# Patient Record
Sex: FEMALE | Race: OTHER | Employment: STUDENT
[De-identification: names, ages, dates, MRNs, and addresses within clinical notes are randomized per-mention and may not be internally consistent; named-entity substitution may affect disease eponyms.]

---

## 2017-01-04 ENCOUNTER — PROCEDURE VISIT (OUTPATIENT)
Dept: OBGYN | Facility: CLINIC | Age: 22
End: 2017-01-04

## 2017-01-04 VITALS
HEIGHT: 67 IN | DIASTOLIC BLOOD PRESSURE: 83 MMHG | SYSTOLIC BLOOD PRESSURE: 134 MMHG | BODY MASS INDEX: 22.49 KG/M2 | WEIGHT: 143.3 LBS | HEART RATE: 86 BPM

## 2017-01-04 DIAGNOSIS — Z23 NEED FOR HPV VACCINATION: ICD-10-CM

## 2017-01-04 DIAGNOSIS — R87.610 ATYPICAL SQUAMOUS CELLS OF UNDETERMINED SIGNIFICANCE ON CYTOLOGIC SMEAR OF CERVIX (ASC-US): Primary | ICD-10-CM

## 2017-01-04 PROCEDURE — 90651 9VHPV VACCINE 2/3 DOSE IM: CPT | Performed by: OBSTETRICS & GYNECOLOGY

## 2017-01-04 PROCEDURE — 90471 IMMUNIZATION ADMIN: CPT | Performed by: OBSTETRICS & GYNECOLOGY

## 2017-01-04 PROCEDURE — 57456 ENDOCERV CURETTAGE W/SCOPE: CPT | Performed by: OBSTETRICS & GYNECOLOGY

## 2017-03-08 ENCOUNTER — OFFICE VISIT (OUTPATIENT)
Dept: OBGYN | Facility: CLINIC | Age: 22
End: 2017-03-08

## 2017-03-08 DIAGNOSIS — Z23 NEED FOR HPV VACCINE: Primary | ICD-10-CM

## 2017-07-12 ENCOUNTER — OFFICE VISIT (OUTPATIENT)
Dept: OBGYN CLINIC | Age: 22
End: 2017-07-12
Payer: COMMERCIAL

## 2017-07-12 VITALS
BODY MASS INDEX: 22.18 KG/M2 | HEIGHT: 66 IN | DIASTOLIC BLOOD PRESSURE: 79 MMHG | HEART RATE: 82 BPM | WEIGHT: 138 LBS | SYSTOLIC BLOOD PRESSURE: 117 MMHG

## 2017-07-12 DIAGNOSIS — Z23 NEED FOR HPV VACCINATION: Primary | ICD-10-CM

## 2017-07-12 PROCEDURE — 90471 IMMUNIZATION ADMIN: CPT | Performed by: OBSTETRICS & GYNECOLOGY

## 2017-07-12 PROCEDURE — 90651 9VHPV VACCINE 2/3 DOSE IM: CPT | Performed by: OBSTETRICS & GYNECOLOGY

## 2017-07-25 ENCOUNTER — TELEPHONE (OUTPATIENT)
Dept: OBGYN CLINIC | Age: 22
End: 2017-07-25

## 2017-07-25 DIAGNOSIS — N30.00 ACUTE CYSTITIS WITHOUT HEMATURIA: Primary | ICD-10-CM

## 2017-07-25 RX ORDER — CIPROFLOXACIN 500 MG/1
250 TABLET, FILM COATED ORAL 2 TIMES DAILY
Qty: 6 TABLET | Refills: 0 | Status: SHIPPED | OUTPATIENT
Start: 2017-07-25 | End: 2017-07-28

## 2017-11-13 RX ORDER — NORGESTIMATE AND ETHINYL ESTRADIOL 7DAYSX3 28
1 KIT ORAL DAILY
Qty: 28 TABLET | Refills: 0 | Status: SHIPPED | OUTPATIENT
Start: 2017-11-13 | End: 2018-01-02 | Stop reason: SDUPTHER

## 2017-12-20 ENCOUNTER — TELEPHONE (OUTPATIENT)
Dept: OBGYN CLINIC | Age: 22
End: 2017-12-20

## 2017-12-20 ENCOUNTER — PATIENT MESSAGE (OUTPATIENT)
Dept: OBGYN CLINIC | Age: 22
End: 2017-12-20

## 2018-01-02 RX ORDER — NORGESTIMATE AND ETHINYL ESTRADIOL 7DAYSX3 28
1 KIT ORAL DAILY
Qty: 28 TABLET | Refills: 0 | Status: SHIPPED | OUTPATIENT
Start: 2018-01-02 | End: 2018-01-10 | Stop reason: SDUPTHER

## 2018-01-10 ENCOUNTER — HOSPITAL ENCOUNTER (OUTPATIENT)
Age: 23
Setting detail: SPECIMEN
Discharge: HOME | End: 2018-01-10
Payer: COMMERCIAL

## 2018-01-10 ENCOUNTER — OFFICE VISIT (OUTPATIENT)
Dept: OBGYN CLINIC | Age: 23
End: 2018-01-10
Payer: COMMERCIAL

## 2018-01-10 VITALS
BODY MASS INDEX: 20.88 KG/M2 | DIASTOLIC BLOOD PRESSURE: 80 MMHG | HEART RATE: 83 BPM | SYSTOLIC BLOOD PRESSURE: 118 MMHG | HEIGHT: 67 IN | WEIGHT: 133 LBS

## 2018-01-10 DIAGNOSIS — R22.32 AXILLARY LUMP, LEFT: ICD-10-CM

## 2018-01-10 DIAGNOSIS — Z01.419 WOMEN'S ANNUAL ROUTINE GYNECOLOGICAL EXAMINATION: Primary | ICD-10-CM

## 2018-01-10 PROCEDURE — 99395 PREV VISIT EST AGE 18-39: CPT | Performed by: OBSTETRICS & GYNECOLOGY

## 2018-01-10 RX ORDER — NORGESTIMATE AND ETHINYL ESTRADIOL 7DAYSX3 28
1 KIT ORAL DAILY
Qty: 28 TABLET | Refills: 12 | Status: SHIPPED | OUTPATIENT
Start: 2018-01-10 | End: 2019-01-11 | Stop reason: SDUPTHER

## 2018-01-10 ASSESSMENT — ENCOUNTER SYMPTOMS
ABDOMINAL PAIN: 0
SHORTNESS OF BREATH: 0
COUGH: 0

## 2018-01-10 NOTE — PROGRESS NOTES
[facility]ANS  [facility] - [address] [place] [id number]  Dept: [phone]    Chief complaint:   Chief Complaint   Patient presents with    Gynecologic Exam     prev pap 2017, negative       History Present Illness: Skip Cadena is a 24 yo female who presents for her annual exam.  She complains of a lump under her left axilla which she says started in October. She did have a migraine headache once in the last 5 years. She says she used to have migraines, and her PCP questioned if she should be on birth control. She denies any aura with them. She denies any bowel or bladder complaints. She does get frequent UTIs and has seen urology in the past, but since she has started probiotics they seem to be occurring less frequently. Current Medications (OTC/Herbal):   Current Outpatient Prescriptions   Medication Sig Dispense Refill    Norgestim-Eth Estrad Triphasic (TRI-SPRINTEC) 0.18/0.215/0.25 MG-35 MCG TABS Take 1 tablet by mouth daily 28 tablet 0    FLUARIX QUADRIVALENT 0.5 ML injection Inject 0.5 mLs into the muscle once  0    albuterol sulfate  (90 BASE) MCG/ACT inhaler Inhale 2 puffs into the lungs as needed      amphetamine-dextroamphetamine (ADDERALL) 20 MG tablet Take 20 mg by mouth 2 times daily       No current facility-administered medications for this visit.       Allergies: No Known Allergies  Past Medical History:   Past Medical History:   Diagnosis Date    ADHD (attention deficit hyperactivity disorder)      Past Surgical History:   Past Surgical History:   Procedure Laterality Date    WISDOM TOOTH EXTRACTION  2015     Obstetric History:   0  Para 0  Gynecologic History: LMP 17   Duration 2-4 d    Interval q  28 d  Tampons/Pads in a day: 3  Last Pap: 17       Any history of abnormal paps yes, ASCUS in 2016    PriorColpo/Biopsy colp without biopsies     Last Mammogram n/a   Result  Contraception: OCPs  Complications: none  STDs: HPV  Psychosocial History: Occupation:   Dental hygenist   Caffeine Yes    At risk for depression No    Abuse:   No  Seatbelt:   Yes  Exercise:  2-3x/wk    Social History     Social History    Marital status: Single     Spouse name: N/A    Number of children: N/A    Years of education: N/A     Occupational History    Not on file. Social History Main Topics    Smoking status: Never Smoker    Smokeless tobacco: Never Used    Alcohol use Yes      Comment: social    Drug use: No    Sexual activity: Yes     Partners: Male     Other Topics Concern    Not on file     Social History Narrative    No narrative on file       Family History   Problem Relation Age of Onset    Diabetes Mother      pre diabetic    Hypertension Maternal Grandmother     Heart Failure Maternal Grandmother     Stroke Maternal Grandmother     Anemia Maternal Grandmother        Review of Systems:   Review of Systems   Constitutional: Negative for chills and fever. HENT: Negative for hearing loss. Respiratory: Negative for cough and shortness of breath. Cardiovascular: Negative for chest pain and palpitations. Gastrointestinal: Negative for abdominal pain. Genitourinary: Negative for frequency. Denies vaginal discharge. Neurological: Negative for dizziness and headaches. Psychiatric/Behavioral: Negative for depression. Physical exam:  vitals:  Height   5  ft    7 in,  Weight    133 lbs,   118/80 BP  Gen: alert, no apparent distress  HEENT: No pathologic skin lesions noted,NC/AT,PERRL, normal midline nontender thyroid   Lung Exam: Clear to auscultation in all fields bilaterally, without wheezes,rales or rhonchi. Cardiac Exam: Normal sinus rhythm and rate, without murmurs, rubs or gallops appreciated. Breast Exam: Symmetric without pathological skin changes, nontender without discrete suspicious masses palpated, supraclavicular or axillary adenopathy or nipple discharge noted.   Abdominal Exam:

## 2018-01-19 LAB — CYTOLOGY REPORT: NORMAL

## 2018-09-12 ENCOUNTER — HOSPITAL ENCOUNTER (OUTPATIENT)
Dept: MAMMOGRAPHY | Age: 23
End: 2018-09-12
Payer: COMMERCIAL

## 2018-09-12 ENCOUNTER — HOSPITAL ENCOUNTER (OUTPATIENT)
Dept: ULTRASOUND IMAGING | Age: 23
Discharge: HOME | End: 2018-09-14
Payer: COMMERCIAL

## 2018-09-12 DIAGNOSIS — R22.32 AXILLARY LUMP, LEFT: ICD-10-CM

## 2018-09-12 PROCEDURE — 76642 ULTRASOUND BREAST LIMITED: CPT

## 2019-01-11 ENCOUNTER — OFFICE VISIT (OUTPATIENT)
Dept: OBGYN CLINIC | Age: 24
End: 2019-01-11
Payer: COMMERCIAL

## 2019-01-11 ENCOUNTER — HOSPITAL ENCOUNTER (OUTPATIENT)
Age: 24
Setting detail: SPECIMEN
Discharge: HOME | End: 2019-01-11
Payer: COMMERCIAL

## 2019-01-11 VITALS
WEIGHT: 128 LBS | SYSTOLIC BLOOD PRESSURE: 119 MMHG | DIASTOLIC BLOOD PRESSURE: 77 MMHG | HEIGHT: 67 IN | BODY MASS INDEX: 20.09 KG/M2 | HEART RATE: 101 BPM

## 2019-01-11 DIAGNOSIS — Z01.419 WOMEN'S ANNUAL ROUTINE GYNECOLOGICAL EXAMINATION: Primary | ICD-10-CM

## 2019-01-11 PROCEDURE — 99395 PREV VISIT EST AGE 18-39: CPT | Performed by: OBSTETRICS & GYNECOLOGY

## 2019-01-11 RX ORDER — NORGESTIMATE AND ETHINYL ESTRADIOL 7DAYSX3 28
1 KIT ORAL DAILY
Qty: 28 TABLET | Refills: 12 | Status: SHIPPED | OUTPATIENT
Start: 2019-01-11 | End: 2020-01-14 | Stop reason: SDUPTHER

## 2019-01-11 ASSESSMENT — ENCOUNTER SYMPTOMS
ABDOMINAL PAIN: 0
SHORTNESS OF BREATH: 0
BACK PAIN: 0

## 2019-01-26 LAB — CYTOLOGY REPORT: NORMAL

## 2020-01-14 ENCOUNTER — OFFICE VISIT (OUTPATIENT)
Dept: OBGYN CLINIC | Age: 25
End: 2020-01-14
Payer: COMMERCIAL

## 2020-01-14 VITALS
HEART RATE: 80 BPM | SYSTOLIC BLOOD PRESSURE: 116 MMHG | WEIGHT: 130 LBS | HEIGHT: 67 IN | DIASTOLIC BLOOD PRESSURE: 70 MMHG | BODY MASS INDEX: 20.4 KG/M2

## 2020-01-14 PROCEDURE — 99395 PREV VISIT EST AGE 18-39: CPT | Performed by: OBSTETRICS & GYNECOLOGY

## 2020-01-14 RX ORDER — NORGESTIMATE AND ETHINYL ESTRADIOL 7DAYSX3 28
1 KIT ORAL DAILY
Qty: 28 TABLET | Refills: 12 | Status: SHIPPED | OUTPATIENT
Start: 2020-01-14 | End: 2021-03-15 | Stop reason: SDUPTHER

## 2020-01-14 ASSESSMENT — ENCOUNTER SYMPTOMS
COUGH: 0
ABDOMINAL PAIN: 0
SHORTNESS OF BREATH: 0
BACK PAIN: 0

## 2020-01-14 NOTE — PROGRESS NOTES
History     Socioeconomic History    Marital status: Single     Spouse name: Not on file    Number of children: Not on file    Years of education: Not on file    Highest education level: Not on file   Occupational History    Not on file   Social Needs    Financial resource strain: Not on file    Food insecurity:     Worry: Not on file     Inability: Not on file    Transportation needs:     Medical: Not on file     Non-medical: Not on file   Tobacco Use    Smoking status: Never Smoker    Smokeless tobacco: Never Used   Substance and Sexual Activity    Alcohol use: Yes     Comment: social    Drug use: No    Sexual activity: Yes     Partners: Male   Lifestyle    Physical activity:     Days per week: Not on file     Minutes per session: Not on file    Stress: Not on file   Relationships    Social connections:     Talks on phone: Not on file     Gets together: Not on file     Attends [employer] service: Not on file     Active member of club or organization: Not on file     Attends meetings of clubs or organizations: Not on file     Relationship status: Not on file    Intimate partner violence:     Fear of current or ex partner: Not on file     Emotionally abused: Not on file     Physically abused: Not on file     Forced sexual activity: Not on file   Other Topics Concern    Not on file   Social History Narrative    Not on file       Family History   Problem Relation Age of Onset    Diabetes Mother         pre diabetic    Hypertension Maternal Grandmother     Heart Failure Maternal Grandmother     Stroke Maternal Grandmother     Anemia Maternal Grandmother        Review of Systems:   Review of Systems   Constitutional: Negative for chills and fever. HENT: Negative for congestion. Respiratory: Negative for cough and shortness of breath. Cardiovascular: Negative for chest pain and palpitations. Gastrointestinal: Negative for abdominal pain.    Genitourinary: Negative for dyspareunia and vaginal discharge. Musculoskeletal: Negative for back pain. Neurological: Negative for dizziness and light-headedness. Psychiatric/Behavioral: The patient is not nervous/anxious. Physical exam:  vitals:  Height   5  ft    7 in,  Weight    130 lbs,   116/70 BP  Gen: alert, no apparent distress  HEENT:No pathologic skin lesions noted,NC/AT,PERRL, normal midline nontender thyroid   Lung Exam: Clear to auscultation in all fields bilaterally, without wheezes,rales or rhonchi. Cardiac Exam: Normal sinus rhythm andrate, without murmurs, rubs or gallops appreciated. Breast Exam: Symmetric without pathological skin changes, nontender without discrete suspicious masses palpated, supraclavicular or axillary adenopathy or nipple discharge noted. Abdominal Exam: Nontender to deep palpation without organomegaly, masses or CVAT appreciated, BS positive. No spinal deformation or tenderness. External Genitalia: Normal development without vulvar,vaginal or cervical lesions noted. Normal vaginal discharge, uterus anterior, 4-6 weeks without CMT. Adnexa nontender without abnormal masses bilaterally. Rectal Exam: Omitted. Extremities: Nontender without clubbing, cyanosis or edema. F.R.O.M. Neurologic Exam: Grossly intact without noted sensorimotor deficits and oriented x 3. Assessment/Plan:   Unremarkable annual Gyn exam.    Cervical Cytology Evaluation begins at 24years old. If Negative Cytology, Follow-up screening per current guidelines. Mammograms every 1year. If 35 yo and last mammogram was negative. Birth control and barrier recommendations discussed. STD counseling and prevention reviewed. Routine health maintenance per patients PCP.   Pt to follow up for annual exam in 1 year     Humberto Willoughby MD  [address] [place] [address]

## 2020-01-27 ENCOUNTER — TELEPHONE (OUTPATIENT)
Dept: OBGYN CLINIC | Age: 25
End: 2020-01-27

## 2020-01-27 RX ORDER — NORGESTIMATE AND ETHINYL ESTRADIOL 7DAYSX3 28
1 KIT ORAL DAILY
Qty: 28 TABLET | Refills: 12 | Status: SHIPPED | OUTPATIENT
Start: 2020-01-27 | End: 2021-01-24 | Stop reason: SDUPTHER

## 2021-03-15 ENCOUNTER — OFFICE VISIT (OUTPATIENT)
Dept: OBGYN CLINIC | Age: 26
End: 2021-03-15
Payer: COMMERCIAL

## 2021-03-15 VITALS
SYSTOLIC BLOOD PRESSURE: 124 MMHG | DIASTOLIC BLOOD PRESSURE: 84 MMHG | HEIGHT: 66 IN | HEART RATE: 92 BPM | WEIGHT: 125 LBS | BODY MASS INDEX: 20.09 KG/M2

## 2021-03-15 DIAGNOSIS — Z01.419 ENCOUNTER FOR WELL WOMAN EXAM WITH ROUTINE GYNECOLOGICAL EXAM: Primary | ICD-10-CM

## 2021-03-15 PROCEDURE — 99395 PREV VISIT EST AGE 18-39: CPT | Performed by: OBSTETRICS & GYNECOLOGY

## 2021-03-15 RX ORDER — NORGESTIMATE AND ETHINYL ESTRADIOL 7DAYSX3 28
1 KIT ORAL DAILY
Qty: 28 TABLET | Refills: 12 | Status: SHIPPED | OUTPATIENT
Start: 2021-03-15 | End: 2022-03-18 | Stop reason: SDUPTHER

## 2021-03-15 ASSESSMENT — ENCOUNTER SYMPTOMS
COUGH: 0
BACK PAIN: 0
ABDOMINAL PAIN: 0
SHORTNESS OF BREATH: 0

## 2021-03-15 NOTE — PROGRESS NOTES
[facility]  [facility] - [address] [address] [facility]  Dept: [phone]    Chief complaint:   Chief Complaint   Patient presents with    Gynecologic Exam     Last pap 19 WNL       History Present Illness: Monica Harmon is a 29 yo female who presents for her annual exam.  She is doing well without any complaints. She was  last year and they are doing well. She is thinking they might want to try to conceive in the next few years, but isn't ready to conceive yet. She is feeling a lot of pressure and just isn't ready. She is sexually active with her  and denies any dyspareunia or vaginal discharge. She denies any bowel or bladder issues. Current Medications (OTC/Herbal):   Current Outpatient Medications   Medication Sig Dispense Refill    Norgestim-Eth Estrad Triphasic (TRI-SPRINTEC) 0.18/0.215/0.25 MG-35 MCG TABS Take 1 tablet by mouth daily 28 tablet 12    Lisdexamfetamine Dimesylate (VYVANSE) 40 MG CAPS Take 1 tablet by mouth daily. Heloisnessa Binet FLUARIX QUADRIVALENT 0.5 ML injection Inject 0.5 mLs into the muscle once  0    albuterol sulfate  (90 BASE) MCG/ACT inhaler Inhale 2 puffs into the lungs as needed      Norgestim-Eth Estrad Triphasic 0.18/0.215/0.25 MG-35 MCG TABS Take 1 tablet by mouth daily 28 tablet 2     No current facility-administered medications for this visit.       Allergies: No Known Allergies  Past Medical History:   Past Medical History:   Diagnosis Date    ADHD (attention deficit hyperactivity disorder)      Past Surgical History:   Past Surgical History:   Procedure Laterality Date    WISDOM TOOTH EXTRACTION  2015     Obstetric History:   0  Para 0  Gynecologic History: LMP 19   Menarche 10  Duration 2-3 d    Interval q  28 d  Tampons/Pads in a day: 2-4  Last Pap: 19       Any history of abnormal paps ASCUS     PriorColpo/Biopsy colp without bx     Last Mammogram n/a  Contraception: OCPs  Complications: none  STDs: HPV  Psychosocial History: Occupation:   Dental hygienist school  Caffeine Yes    At risk for depression No    Abuse:   no  Seatbelt:   Yes  Exercise:  2-3x/wk    Social History     Socioeconomic History    Marital status: Single     Spouse name: Not on file    Number of children: Not on file    Years of education: Not on file    Highest education level: Not on file   Occupational History    Not on file   Social Needs    Financial resource strain: Not on file    Food insecurity     Worry: Not on file     Inability: Not on file   [employer] needs     Medical: Not on file     Non-medical: Not on file   Tobacco Use    Smoking status: Never Smoker    Smokeless tobacco: Never Used   Substance and Sexual Activity    Alcohol use: Yes     Comment: social    Drug use: No    Sexual activity: Yes     Partners: Male   Lifestyle    Physical activity     Days per week: Not on file     Minutes per session: Not on file    Stress: Not on file   Relationships    Social connections     Talks on phone: Not on file     Gets together: Not on file     Attends [employer] service: Not on file     Active member of club or organization: Not on file     Attends meetings of clubs or organizations: Not on file     Relationship status: Not on file    Intimate partner violence     Fear of current or ex partner: Not on file     Emotionally abused: Not on file     Physically abused: Not on file     Forced sexual activity: Not on file   Other Topics Concern    Not on file   Social History Narrative    Not on file       Family History   Problem Relation Age of Onset    Diabetes Mother         pre diabetic    Hypertension Maternal Grandmother     Heart Failure Maternal Grandmother     Stroke Maternal Grandmother     Anemia Maternal Grandmother        Review of Systems:   Review of Systems   Constitutional: Negative for chills and fever. HENT: Negative for congestion.     Respiratory: Negative for cough and shortness of breath. Cardiovascular: Negative for chest pain and palpitations. Gastrointestinal: Negative for abdominal pain. Genitourinary: Negative for dyspareunia, pelvic pain and vaginal discharge. Musculoskeletal: Negative for back pain. Neurological: Negative for dizziness and light-headedness. Psychiatric/Behavioral: The patient is not nervous/anxious. Physical exam:  vitals:  Height   5  ft    6 in,  Weight    125 lbs,   124/84 BP  Gen: alert, no apparent distress  HEENT:No pathologic skin lesions noted,NC/AT,PERRL, normal midline nontender thyroid   Lung Exam: Clear to auscultation in all fields bilaterally, without wheezes,rales or rhonchi. Cardiac Exam: Normal sinus rhythm andrate, without murmurs, rubs or gallops appreciated. Breast Exam: Symmetric without pathological skin changes, nontender without discrete suspicious masses palpated, supraclavicular or axillary adenopathy or nipple discharge noted. Abdominal Exam: Nontender to deep palpation without organomegaly, masses or CVAT appreciated, BS positive. No spinal deformation or tenderness. External Genitalia: Normal development without vulvar,vaginal or cervical lesions noted. Normal vaginal discharge, uterus anterior, 4-6 weeks without CMT. Adnexa nontender without abnormal masses bilaterally. Rectal Exam: Omitted. Extremities: Nontender without clubbing, cyanosis or edema. F.R.O.M. Neurologic Exam: Grossly intact without noted sensorimotor deficits and oriented x 3. Assessment/Plan:   Unremarkable annual Gyn exam.    Cervical Cytology Evaluation begins at 24years old. If Negative Cytology, Follow-up screening per current guidelines. Mammograms every 1year. If 37 yo and last mammogram was negative. Birth control and barrier recommendations discussed. STD counseling and prevention reviewed. Routine health maintenance per patients PCP.   Pt to follow up for annual exam in 1 year    Lakeisha Sanchez [facility]

## 2022-03-18 ENCOUNTER — OFFICE VISIT (OUTPATIENT)
Dept: OBGYN CLINIC | Age: 27
End: 2022-03-18
Payer: COMMERCIAL

## 2022-03-18 VITALS
HEIGHT: 65 IN | BODY MASS INDEX: 24.16 KG/M2 | DIASTOLIC BLOOD PRESSURE: 78 MMHG | SYSTOLIC BLOOD PRESSURE: 114 MMHG | WEIGHT: 145 LBS | HEART RATE: 83 BPM

## 2022-03-18 DIAGNOSIS — N94.6 DYSMENORRHEA: ICD-10-CM

## 2022-03-18 DIAGNOSIS — Z01.419 WOMEN'S ANNUAL ROUTINE GYNECOLOGICAL EXAMINATION: Primary | ICD-10-CM

## 2022-03-18 PROCEDURE — 99395 PREV VISIT EST AGE 18-39: CPT | Performed by: OBSTETRICS & GYNECOLOGY

## 2022-03-18 RX ORDER — NORGESTIMATE AND ETHINYL ESTRADIOL 7DAYSX3 28
1 KIT ORAL DAILY
Qty: 28 TABLET | Refills: 12 | Status: SHIPPED | OUTPATIENT
Start: 2022-03-18

## 2022-03-18 ASSESSMENT — ENCOUNTER SYMPTOMS
ABDOMINAL PAIN: 0
COUGH: 0
SHORTNESS OF BREATH: 0
BACK PAIN: 0

## 2022-03-18 NOTE — PROGRESS NOTES
[address] [facility] - [address] [facility] [facility] [place] [address]  Dept: [phone]    Chief complaint:   Chief Complaint   Patient presents with    Annual Exam     last 2019 N        History Present Illness: Jorge Logan is a 28 yo female who presents for her annual exam.  She is doing well without any complaints. She has been on OCPs for a long time and wanted to make sure it was ok to continue on it. She is very happy with her job and how life is going. She is going back to school for her bachelor's in interdisciplinary studies and biology. 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 She is sexually active with her  and denies any dyspareunia. She denies any bowel or bladder issues. Current Medications (OTC/Herbal):   Current Outpatient Medications   Medication Sig Dispense Refill    Norgestim-Eth Estrad Triphasic (TRI-SPRINTEC) 0.18/0.215/0.25 MG-35 MCG TABS Take 1 tablet by mouth daily 28 tablet 12    Lisdexamfetamine Dimesylate (VYVANSE) 40 MG CAPS Take 1 tablet by mouth daily. Shaye Ferris Norgestim-Eth Estrad Triphasic 0.18/0.215/0.25 MG-35 MCG TABS Take 1 tablet by mouth daily 28 tablet 2    FLUARIX QUADRIVALENT 0.5 ML injection Inject 0.5 mLs into the muscle once  0    albuterol sulfate  (90 BASE) MCG/ACT inhaler Inhale 2 puffs into the lungs as needed       No current facility-administered medications for this visit.      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    At risk for depression No    Abuse:   no  Seatbelt:   Yes  Exercise:  2-3x/wk    Social History     Socioeconomic History    Marital status: Single     Spouse name: Not on file    Number of children: Not on file    Years of education: Not on file    Highest education level: Not on file   Occupational History    Not on file   Tobacco Use    Smoking status: Never Smoker    Smokeless tobacco: Never Used   Vaping Use    Vaping Use: Never used   Substance and Sexual Activity    Alcohol use: Yes     Comment: social    Drug use: No    Sexual activity: Yes     Partners: Male   Other Topics Concern    Not on file   Social History Narrative    Not on file     Social Determinants of Health     Financial Resource Strain:     Difficulty of Paying Living Expenses: Not on file   Food Insecurity:     Worried About Running Out of Food in the Last Year: Not on file    Kaleigh of Food in the Last Year: Not on file   Transportation Needs:     Lack of Transportation (Medical): Not on file    Lack of Transportation (Non-Medical):  Not on file   Physical Activity:     Days of Exercise per Week: Not on file    Minutes of Exercise per Session: Not on file   Stress:     Feeling of Stress : Not on file   Social Connections:     Frequency of Communication with Friends and Family: Not on file    Frequency of Social Gatherings with Friends and Family: Not on file    Attends Temple Services: Not on file    Active Member of Clubs or Organizations: Not on file    Attends Club or Organization Meetings: Not on file    Marital Status: Not on file   Intimate Partner Violence:     Fear of Current or Ex-Partner: Not on file    Emotionally Abused: Not on file    Physically Abused: Not on file    Sexually Abused: Not on file   Housing Stability:     Unable to Pay for Housing in the Last Year: Not on file    Number of Places Lived in the Last Year: Not on file    Unstable Housing in the Last Year: Not on file       Family History   Problem Relation Age of Onset    Diabetes Mother         pre diabetic    Hypertension Maternal Grandmother     Heart Failure Maternal Grandmother     Stroke Maternal Grandmother     Anemia Maternal Grandmother        Review of Systems:   Review of Systems   Constitutional: Negative for chills and fever. HENT: Negative for congestion. Respiratory: Negative for cough and shortness of breath. Cardiovascular: Negative for chest pain and palpitations. Gastrointestinal: Negative for abdominal pain. Genitourinary: Negative for dyspareunia, pelvic pain and vaginal discharge. Musculoskeletal: Negative for back pain. Neurological: Negative for dizziness and light-headedness. Psychiatric/Behavioral: The patient is not nervous/anxious. Physical exam:  vitals:  Height   5  ft    6 in,  Weight    145 lbs,   114/78 BP  Gen: alert, no apparent distress  HEENT:No pathologic skin lesions noted,NC/AT,PERRL, normal midline nontender thyroid   Lung Exam: Clear to auscultation in all fields bilaterally, without wheezes,rales or rhonchi. Cardiac Exam: Normal sinus rhythm andrate, without murmurs, rubs or gallops appreciated. Breast Exam: Symmetric without pathological skin changes, nontender without discrete suspicious masses palpated, supraclavicular or axillary adenopathy or nipple discharge noted. Abdominal Exam: Nontender to deep palpation without organomegaly, masses or CVAT appreciated, BS positive. No spinal deformation or tenderness. External Genitalia: Normal development without vulvar,vaginal or cervical lesions noted. Normal vaginal discharge, uterus anterior, 4-6 weeks without CMT. Adnexa nontender without abnormal masses bilaterally. Rectal Exam: Omitted. Extremities: Nontender without clubbing, cyanosis or edema. F. R.O.M. Neurologic Exam: Grossly intact without noted sensorimotor deficits and oriented x 3. Assessment/Plan:   Unremarkable annual Gyn exam.    Cervical Cytology Evaluation begins at 24years old. If Negative Cytology, Follow-up screening per current guidelines. Mammograms every 1year. If 37 yo and last mammogram was negative. Hereditary Breast, Ovarian, Colon and Uterine Cancer screening done. Birth control and barrier recommendations discussed. STD counseling and prevention reviewed. Gardisil counseling completed for all patients 7-33 yo. Routine health maintenance per patients PCP.   Pt to follow up for annual exam in 1 year    Razia Dinh MD  [address]

## 2022-03-24 SDOH — HEALTH STABILITY: PHYSICAL HEALTH: ON AVERAGE, HOW MANY MINUTES DO YOU ENGAGE IN EXERCISE AT THIS LEVEL?: 60 MIN

## 2022-03-24 SDOH — HEALTH STABILITY: PHYSICAL HEALTH: ON AVERAGE, HOW MANY DAYS PER WEEK DO YOU ENGAGE IN MODERATE TO STRENUOUS EXERCISE (LIKE A BRISK WALK)?: 3 DAYS

## 2022-03-25 ENCOUNTER — OFFICE VISIT (OUTPATIENT)
Dept: FAMILY MEDICINE CLINIC | Age: 27
End: 2022-03-25
Payer: COMMERCIAL

## 2022-03-25 VITALS
OXYGEN SATURATION: 99 % | HEART RATE: 98 BPM | BODY MASS INDEX: 24.13 KG/M2 | SYSTOLIC BLOOD PRESSURE: 110 MMHG | DIASTOLIC BLOOD PRESSURE: 70 MMHG | WEIGHT: 145 LBS

## 2022-03-25 DIAGNOSIS — Z79.899 MEDICATION MANAGEMENT: ICD-10-CM

## 2022-03-25 DIAGNOSIS — M54.2 NECK PAIN: ICD-10-CM

## 2022-03-25 DIAGNOSIS — F90.2 ATTENTION DEFICIT HYPERACTIVITY DISORDER (ADHD), COMBINED TYPE: ICD-10-CM

## 2022-03-25 DIAGNOSIS — Z13.29 SCREENING FOR THYROID DISORDER: ICD-10-CM

## 2022-03-25 DIAGNOSIS — Z76.89 ENCOUNTER TO ESTABLISH CARE: Primary | ICD-10-CM

## 2022-03-25 DIAGNOSIS — Z13.220 SCREENING, LIPID: ICD-10-CM

## 2022-03-25 PROCEDURE — 99204 OFFICE O/P NEW MOD 45 MIN: CPT | Performed by: NURSE PRACTITIONER

## 2022-03-25 RX ORDER — MONTELUKAST SODIUM 10 MG/1
10 TABLET ORAL NIGHTLY
Qty: 90 TABLET | Refills: 1 | Status: CANCELLED | OUTPATIENT
Start: 2022-03-25

## 2022-03-25 RX ORDER — LISDEXAMFETAMINE DIMESYLATE 40 MG/1
40 CAPSULE ORAL DAILY
Qty: 30 CAPSULE | Refills: 0 | Status: SHIPPED | OUTPATIENT
Start: 2022-05-25 | End: 2022-06-24

## 2022-03-25 RX ORDER — LISDEXAMFETAMINE DIMESYLATE 40 MG/1
40 CAPSULE ORAL DAILY
Qty: 30 CAPSULE | Refills: 0 | Status: SHIPPED | OUTPATIENT
Start: 2022-04-25 | End: 2022-05-25

## 2022-03-25 RX ORDER — LISDEXAMFETAMINE DIMESYLATE 40 MG/1
1 CAPSULE ORAL DAILY
Qty: 30 CAPSULE | Refills: 0 | Status: SHIPPED | OUTPATIENT
Start: 2022-03-25 | End: 2022-04-24

## 2022-03-25 RX ORDER — PANTOPRAZOLE SODIUM 20 MG/1
20 TABLET, DELAYED RELEASE ORAL DAILY
Qty: 90 TABLET | Refills: 1 | Status: CANCELLED | OUTPATIENT
Start: 2022-03-25

## 2022-03-25 RX ORDER — PANTOPRAZOLE SODIUM 20 MG/1
20 TABLET, DELAYED RELEASE ORAL DAILY
COMMUNITY

## 2022-03-25 RX ORDER — CETIRIZINE HYDROCHLORIDE 10 MG/1
10 TABLET ORAL
COMMUNITY
Start: 2021-12-01

## 2022-03-25 RX ORDER — MONTELUKAST SODIUM 10 MG/1
10 TABLET ORAL NIGHTLY
COMMUNITY
Start: 2021-12-01

## 2022-03-25 SDOH — ECONOMIC STABILITY: FOOD INSECURITY: WITHIN THE PAST 12 MONTHS, THE FOOD YOU BOUGHT JUST DIDN'T LAST AND YOU DIDN'T HAVE MONEY TO GET MORE.: NEVER TRUE

## 2022-03-25 SDOH — ECONOMIC STABILITY: FOOD INSECURITY: WITHIN THE PAST 12 MONTHS, YOU WORRIED THAT YOUR FOOD WOULD RUN OUT BEFORE YOU GOT MONEY TO BUY MORE.: NEVER TRUE

## 2022-03-25 ASSESSMENT — SOCIAL DETERMINANTS OF HEALTH (SDOH): HOW HARD IS IT FOR YOU TO PAY FOR THE VERY BASICS LIKE FOOD, HOUSING, MEDICAL CARE, AND HEATING?: NOT HARD AT ALL

## 2022-03-25 ASSESSMENT — PATIENT HEALTH QUESTIONNAIRE - PHQ9
SUM OF ALL RESPONSES TO PHQ QUESTIONS 1-9: 0
1. LITTLE INTEREST OR PLEASURE IN DOING THINGS: 0
SUM OF ALL RESPONSES TO PHQ QUESTIONS 1-9: 0
SUM OF ALL RESPONSES TO PHQ9 QUESTIONS 1 & 2: 0
2. FEELING DOWN, DEPRESSED OR HOPELESS: 0
SUM OF ALL RESPONSES TO PHQ QUESTIONS 1-9: 0
SUM OF ALL RESPONSES TO PHQ QUESTIONS 1-9: 0

## 2022-03-25 ASSESSMENT — ENCOUNTER SYMPTOMS
SHORTNESS OF BREATH: 0
COUGH: 0

## 2022-03-25 NOTE — PROGRESS NOTES
Claudette Hook (:  1995) is a 27 y.o. female,Established patient, here for evaluation of the following chief complaint(s):  Establish Care         ASSESSMENT/PLAN:  1. Encounter to establish care  2. Attention deficit hyperactivity disorder (ADHD), combined type  -     Lisdexamfetamine Dimesylate (VYVANSE) 40 MG CAPS; Take 1 tablet by mouth daily for 30 days. , Disp-30 capsule, R-0Normal  -     Lisdexamfetamine Dimesylate (VYVANSE) 40 MG CAPS; Take 40 mg by mouth daily for 30 days. , Disp-30 capsule, R-0Normal  -     Lisdexamfetamine Dimesylate (VYVANSE) 40 MG CAPS; Take 40 mg by mouth daily for 30 days. , Disp-30 capsule, R-0Normal  3. Neck pain  -     External Referral To Physical Therapy  4. Screening for thyroid disorder  -     TSH With Reflex Ft4; Future  5. Screening, lipid  -     Lipid, Fasting; Future  6. Medication management  -     CBC; Future  -     Comprehensive Metabolic Panel; Future      No follow-ups on file. Subjective   SUBJECTIVE/OBJECTIVE:  HPI   Saw gi for acid reflux. Feels gerd is of good control right now. Hasn't been taking protonix. Does get seasonal allergies. On zyrtec, singulair, flonase and astelin. History of asthma, uses reyna only when sick. Has been out of singulair for a month. Is going to go off her meds, doesn't feel they help     adhd- adderall made her mad all the time. She was switched to vyvanse, has been on this dose for a few years. Her doc in [facility] increased her dose and this was too much. They decreased to 30 mg and she couldn't focus. Would like a referral to physical therapy. Was seeing one in [facility] for neck and upper back tension and it was helping. Review of Systems   Constitutional: Negative for chills and fever. Respiratory: Negative for cough and shortness of breath. Cardiovascular: Negative for chest pain, palpitations and leg swelling.        Objective      Vitals:    22 1417   BP: 110/70   Pulse: 98   SpO2: 99% Physical Exam  Constitutional:       Appearance: She is well-developed. HENT:      Right Ear: Tympanic membrane and external ear normal.      Left Ear: Tympanic membrane and external ear normal.      Nose: Nose normal.   Cardiovascular:      Rate and Rhythm: Normal rate and regular rhythm. Heart sounds: Normal heart sounds, S1 normal and S2 normal.   Pulmonary:      Effort: Pulmonary effort is normal. No respiratory distress. Breath sounds: Normal breath sounds. Musculoskeletal:         General: No deformity. Normal range of motion. Cervical back: Full passive range of motion without pain and normal range of motion. Skin:     General: Skin is warm and dry. Neurological:      Mental Status: She is alert and oriented to person, place, and time. An electronic signature was used to authenticate this note.     --Kali Forbes, APRN - CNP

## 2022-03-25 NOTE — PROGRESS NOTES
Patient is present to establish care  Patient is needing med refills    Patient states she was previously seeing PT  States she has neck and shoulder pain  States she was seeing someone [facility] but would like a referral for someone closer    Patient states was seeing GI for IBS but that doctor has left the practice [place]

## 2022-03-28 DIAGNOSIS — Z01.419 WOMEN'S ANNUAL ROUTINE GYNECOLOGICAL EXAMINATION: ICD-10-CM

## 2022-03-29 ENCOUNTER — HOSPITAL ENCOUNTER (OUTPATIENT)
Age: 27
Setting detail: SPECIMEN
Discharge: HOME | End: 2022-03-29

## 2022-03-29 DIAGNOSIS — Z79.899 MEDICATION MANAGEMENT: ICD-10-CM

## 2022-03-29 DIAGNOSIS — Z13.29 SCREENING FOR THYROID DISORDER: ICD-10-CM

## 2022-03-29 DIAGNOSIS — Z13.220 SCREENING, LIPID: ICD-10-CM

## 2022-03-29 LAB
ALBUMIN SERPL-MCNC: 4.2 G/DL (ref 3.5–5.2)
ALBUMIN/GLOBULIN RATIO: 1.6 (ref 1–2.5)
ALP BLD-CCNC: 48 U/L (ref 35–104)
ALT SERPL-CCNC: 24 U/L (ref 5–33)
ANION GAP SERPL CALCULATED.3IONS-SCNC: 15 MMOL/L (ref 9–17)
AST SERPL-CCNC: 40 U/L
BILIRUB SERPL-MCNC: 0.32 MG/DL (ref 0.3–1.2)
BUN BLDV-MCNC: 18 MG/DL (ref 6–20)
CALCIUM SERPL-MCNC: 9.5 MG/DL (ref 8.6–10.4)
CHLORIDE BLD-SCNC: 103 MMOL/L (ref 98–107)
CHOLESTEROL, FASTING: 180 MG/DL
CHOLESTEROL/HDL RATIO: 2
CO2: 22 MMOL/L (ref 20–31)
CREAT SERPL-MCNC: 0.79 MG/DL (ref 0.5–0.9)
GFR AFRICAN AMERICAN: >60 ML/MIN
GFR NON-AFRICAN AMERICAN: >60 ML/MIN
GFR SERPL CREATININE-BSD FRML MDRD: ABNORMAL ML/MIN/{1.73_M2}
GLUCOSE BLD-MCNC: 70 MG/DL (ref 70–99)
HCT VFR BLD CALC: 40.7 % (ref 36.3–47.1)
HDLC SERPL-MCNC: 89 MG/DL
HEMOGLOBIN: 13.3 G/DL (ref 11.9–15.1)
LDL CHOLESTEROL: 77 MG/DL (ref 0–130)
MCH RBC QN AUTO: 29.3 PG (ref 25.2–33.5)
MCHC RBC AUTO-ENTMCNC: 32.7 G/DL (ref 28.4–34.8)
MCV RBC AUTO: 89.6 FL (ref 82.6–102.9)
NRBC AUTOMATED: 0 PER 100 WBC
PDW BLD-RTO: 12.2 % (ref 11.8–14.4)
PLATELET # BLD: 244 K/UL (ref 138–453)
PMV BLD AUTO: 11.3 FL (ref 8.1–13.5)
POTASSIUM SERPL-SCNC: 4.4 MMOL/L (ref 3.7–5.3)
RBC # BLD: 4.54 M/UL (ref 3.95–5.11)
SODIUM BLD-SCNC: 140 MMOL/L (ref 135–144)
TOTAL PROTEIN: 6.9 G/DL (ref 6.4–8.3)
TRIGLYCERIDE, FASTING: 72 MG/DL
TSH SERPL DL<=0.05 MIU/L-ACNC: 4.81 UIU/ML (ref 0.3–5)
WBC # BLD: 5.7 K/UL (ref 3.5–11.3)

## 2022-03-31 DIAGNOSIS — R74.8 ELEVATED LIVER ENZYMES: Primary | ICD-10-CM

## 2022-05-11 DIAGNOSIS — F90.2 ATTENTION DEFICIT HYPERACTIVITY DISORDER (ADHD), COMBINED TYPE: ICD-10-CM

## 2022-05-11 RX ORDER — LISDEXAMFETAMINE DIMESYLATE 40 MG/1
1 CAPSULE ORAL DAILY
Qty: 30 CAPSULE | Refills: 0 | OUTPATIENT
Start: 2022-05-11 | End: 2022-06-10

## 2022-06-17 ENCOUNTER — OFFICE VISIT (OUTPATIENT)
Dept: FAMILY MEDICINE CLINIC | Age: 27
End: 2022-06-17
Payer: COMMERCIAL

## 2022-06-17 VITALS
WEIGHT: 144 LBS | BODY MASS INDEX: 23.96 KG/M2 | HEART RATE: 96 BPM | DIASTOLIC BLOOD PRESSURE: 70 MMHG | SYSTOLIC BLOOD PRESSURE: 110 MMHG | OXYGEN SATURATION: 97 %

## 2022-06-17 DIAGNOSIS — F90.9 ATTENTION DEFICIT HYPERACTIVITY DISORDER (ADHD), UNSPECIFIED ADHD TYPE: Primary | ICD-10-CM

## 2022-06-17 DIAGNOSIS — Z76.0 MEDICATION REFILL: ICD-10-CM

## 2022-06-17 PROCEDURE — 99213 OFFICE O/P EST LOW 20 MIN: CPT | Performed by: NURSE PRACTITIONER

## 2022-06-17 RX ORDER — ONDANSETRON 4 MG/1
4 TABLET, ORALLY DISINTEGRATING ORAL 3 TIMES DAILY PRN
Qty: 21 TABLET | Refills: 0 | Status: SHIPPED | OUTPATIENT
Start: 2022-06-17

## 2022-06-17 RX ORDER — SUMATRIPTAN 25 MG/1
25 TABLET, FILM COATED ORAL
COMMUNITY
End: 2022-06-17 | Stop reason: SDUPTHER

## 2022-06-17 RX ORDER — SUMATRIPTAN 25 MG/1
25 TABLET, FILM COATED ORAL
Qty: 9 TABLET | Refills: 2 | Status: SHIPPED | OUTPATIENT
Start: 2022-06-17 | End: 2022-09-16

## 2022-06-17 ASSESSMENT — ENCOUNTER SYMPTOMS
COUGH: 0
SHORTNESS OF BREATH: 0

## 2022-06-17 NOTE — PROGRESS NOTES
Fahad Mohan (:  1995) is a 32 y.o. female,Established patient, here for evaluation of the following chief complaint(s):  ADHD and 3 Month Follow-Up         ASSESSMENT/PLAN:  1. Attention deficit hyperactivity disorder (ADHD), unspecified ADHD type  -     Lisdexamfetamine Dimesylate 40 MG CAPS; Take 40 mg by mouth daily for 29 days. , Disp-30 capsule, R-0Normal  -     Lisdexamfetamine Dimesylate 40 MG CAPS; Take 40 mg by mouth daily for 29 days. , Disp-30 capsule, R-0Normal  -     Lisdexamfetamine Dimesylate 40 MG CAPS; Take 40 mg by mouth daily for 29 days. , Disp-30 capsule, R-0Normal  2. Medication refill  -     SUMAtriptan (IMITREX) 25 MG tablet; Take 1 tablet by mouth once as needed for Migraine, Disp-9 tablet, R-2Normal  -     ondansetron (ZOFRAN-ODT) 4 MG disintegrating tablet; Take 1 tablet by mouth 3 times daily as needed for Nausea or Vomiting, Disp-21 tablet, R-0Normal    adhd meds working well    No follow-ups on file. Subjective   SUBJECTIVE/OBJECTIVE:  HPI     Migraine abortive therapy. Not needing very often. Hasn't used since 2019 but likes to have on hand. Review of Systems   Constitutional: Negative for chills and fever. Respiratory: Negative for cough and shortness of breath. Cardiovascular: Negative for chest pain, palpitations and leg swelling. Objective    Vitals:    22 0744   BP: 110/70   Pulse: 96   SpO2: 97%     Wt Readings from Last 3 Encounters:   22 144 lb (65.3 kg)   22 145 lb (65.8 kg)   22 145 lb (65.8 kg)       Physical Exam  Constitutional:       Appearance: She is well-developed. HENT:      Right Ear: External ear normal.      Left Ear: External ear normal.      Nose: Nose normal.   Cardiovascular:      Rate and Rhythm: Normal rate and regular rhythm. Heart sounds: Normal heart sounds, S1 normal and S2 normal.   Pulmonary:      Effort: Pulmonary effort is normal. No respiratory distress.       Breath sounds: Normal breath sounds. Musculoskeletal:         General: No deformity. Normal range of motion. Cervical back: Full passive range of motion without pain and normal range of motion. Skin:     General: Skin is warm and dry. Neurological:      Mental Status: She is alert and oriented to person, place, and time. An electronic signature was used to authenticate this note.     --Deborah Farrell, ADRIANA - CNP

## 2022-06-17 NOTE — PROGRESS NOTES
Patient is present for 3 month f/u for ADHD    Patient states she was getting migraine medication from her previous pcp  States her Imitrex is  and would like this refilled

## 2022-07-08 ENCOUNTER — HOSPITAL ENCOUNTER (OUTPATIENT)
Age: 27
Setting detail: SPECIMEN
Discharge: HOME | End: 2022-07-08

## 2022-07-08 ENCOUNTER — TELEPHONE (OUTPATIENT)
Dept: FAMILY MEDICINE CLINIC | Age: 27
End: 2022-07-08

## 2022-07-08 DIAGNOSIS — R74.8 ELEVATED LIVER ENZYMES: ICD-10-CM

## 2022-07-08 DIAGNOSIS — Z02.0 SCHOOL PHYSICAL EXAM: Primary | ICD-10-CM

## 2022-07-08 LAB
ALBUMIN SERPL-MCNC: 4.2 G/DL (ref 3.5–5.2)
ALBUMIN/GLOBULIN RATIO: 1.6 (ref 1–2.5)
ALP BLD-CCNC: 47 U/L (ref 35–104)
ALT SERPL-CCNC: 14 U/L (ref 5–33)
AST SERPL-CCNC: 17 U/L
BILIRUB SERPL-MCNC: 0.23 MG/DL (ref 0.3–1.2)
BILIRUBIN DIRECT: <0.08 MG/DL
BILIRUBIN, INDIRECT: ABNORMAL MG/DL (ref 0–1)
HEPATITIS C ANTIBODY: NONREACTIVE
TOTAL PROTEIN: 6.8 G/DL (ref 6.4–8.3)

## 2022-07-08 NOTE — TELEPHONE ENCOUNTER
Patient dropped off form for school. Patient is needing titers for Varicella. Patient has had vaccines for Hep B, MMR, and Tdap.

## 2022-07-08 NOTE — TELEPHONE ENCOUNTER
Patient informed. Also informed patient she will need 2nd MMR, we only have record of 1. Patient states she is going to look in her records to see if she already received it and will let us know.

## 2022-09-13 ENCOUNTER — OFFICE VISIT (OUTPATIENT)
Dept: FAMILY MEDICINE CLINIC | Age: 27
End: 2022-09-13
Payer: COMMERCIAL

## 2022-09-13 VITALS
SYSTOLIC BLOOD PRESSURE: 133 MMHG | HEART RATE: 103 BPM | OXYGEN SATURATION: 100 % | DIASTOLIC BLOOD PRESSURE: 99 MMHG | HEIGHT: 65 IN | WEIGHT: 144 LBS | BODY MASS INDEX: 23.99 KG/M2

## 2022-09-13 DIAGNOSIS — U09.9 COVID-19 LONG HAULER MANIFESTING CHRONIC COUGH: Primary | ICD-10-CM

## 2022-09-13 DIAGNOSIS — R05.3 COVID-19 LONG HAULER MANIFESTING CHRONIC COUGH: Primary | ICD-10-CM

## 2022-09-13 DIAGNOSIS — M94.0 COSTOCHONDRITIS: ICD-10-CM

## 2022-09-13 PROCEDURE — 99213 OFFICE O/P EST LOW 20 MIN: CPT

## 2022-09-13 RX ORDER — BENZONATATE 200 MG/1
200 CAPSULE ORAL 3 TIMES DAILY PRN
Qty: 21 CAPSULE | Refills: 0 | Status: SHIPPED | OUTPATIENT
Start: 2022-09-13 | End: 2022-09-20

## 2022-09-13 ASSESSMENT — ENCOUNTER SYMPTOMS
COUGH: 1
NAUSEA: 0
SHORTNESS OF BREATH: 1
HEARTBURN: 0
RHINORRHEA: 0
SORE THROAT: 0
CHEST TIGHTNESS: 1
WHEEZING: 0
ABDOMINAL PAIN: 0
EYE PAIN: 0
HEMOPTYSIS: 0
VOMITING: 0
EYE ITCHING: 0

## 2022-09-13 NOTE — PROGRESS NOTES
[address] [address]  Dept: [phone]  Dept Fax: [phone]    Esther Wagner is a 32 y.o. female who presents to the urgent care today for her medical conditions/complaints as notedbelow. Esther Wagner is c/o of Cough (Pt has had cough and congestion in her chest )      HPI:     Patient states that she tested positive for COVID 11 days ago. Patient presents to the walk in for chest pressure from exercise and cough   Patient also went to [address] [place] [address] 17[facility]M urgent care on 9/10/2022 for headaches and sinus problem and was diagnosed with sinusitis and treated with Augmentin. Patient is not taking the Augmentin right now    PMH: Asthma, anxiety    Cough  This is a chronic problem. The current episode started 1 to 4 weeks ago (about 11 days and progessively got worse in the past two days). The problem has been rapidly worsening. The problem occurs constantly. The cough is Non-productive. Associated symptoms include shortness of breath. Pertinent negatives include no chest pain, chills, ear congestion, ear pain, fever, headaches, heartburn, hemoptysis, myalgias, nasal congestion, postnasal drip, rash, rhinorrhea, sore throat, sweats, weight loss or wheezing. Nothing aggravates the symptoms. She has tried a beta-agonist inhaler (delsym, flonase, albuterol with relief) for the symptoms. The treatment provided mild relief. Her past medical history is significant for asthma and environmental allergies. There is no history of bronchiectasis, bronchitis, COPD, emphysema or pneumonia.      Past Medical History:   Diagnosis Date    ADHD (attention deficit hyperactivity disorder)     Allergic rhinitis     Anxiety     Asthma     IBS (irritable bowel syndrome)         Current Outpatient Medications   Medication Sig Dispense Refill    benzonatate (TESSALON) 200 MG capsule Take 1 capsule by mouth 3 times daily as needed for Cough 21 capsule 0    SUMAtriptan (IMITREX) 25 MG tablet Take 1 tablet by mouth once as needed for Migraine 9 tablet 2    ondansetron (ZOFRAN-ODT) 4 MG disintegrating tablet Take 1 tablet by mouth 3 times daily as needed for Nausea or Vomiting 21 tablet 0    Lisdexamfetamine Dimesylate 40 MG CAPS Take 40 mg by mouth daily for 29 days. 30 capsule 0    Lisdexamfetamine Dimesylate 40 MG CAPS Take 40 mg by mouth daily for 29 days. 30 capsule 0    Lisdexamfetamine Dimesylate 40 MG CAPS Take 40 mg by mouth daily for 29 days. 30 capsule 0    pantoprazole (PROTONIX) 20 MG tablet Take 20 mg by mouth daily (Patient not taking: Reported on 6/17/2022)      montelukast (SINGULAIR) 10 MG tablet Take 10 mg by mouth nightly (Patient not taking: Reported on 6/17/2022)      cetirizine (ZYRTEC) 10 MG tablet Take 10 mg by mouth      Fluticasone Propionate (FLONASE NA) by Nasal route      Lisdexamfetamine Dimesylate (VYVANSE) 40 MG CAPS Take 1 tablet by mouth daily for 30 days. 30 capsule 0    Lisdexamfetamine Dimesylate (VYVANSE) 40 MG CAPS Take 40 mg by mouth daily for 30 days. 30 capsule 0    Lisdexamfetamine Dimesylate (VYVANSE) 40 MG CAPS Take 40 mg by mouth daily for 30 days. 30 capsule 0    Norgestim-Eth Estrad Triphasic (TRI-SPRINTEC) 0.18/0.215/0.25 MG-35 MCG TABS Take 1 tablet by mouth daily 28 tablet 12    albuterol sulfate  (90 BASE) MCG/ACT inhaler Inhale 2 puffs into the lungs as needed       No current facility-administered medications for this visit. Allergies   Allergen Reactions    Other     Seasonal        Subjective:      Review of Systems   Constitutional:  Negative for chills, fever and weight loss. HENT:  Negative for ear pain, postnasal drip, rhinorrhea and sore throat. Eyes:  Negative for pain and itching. Respiratory:  Positive for cough, chest tightness and shortness of breath. Negative for hemoptysis and wheezing. Cardiovascular:  Negative for chest pain and leg swelling. Gastrointestinal:  Negative for abdominal pain, heartburn, nausea and vomiting. Genitourinary:  Negative for difficulty urinating and dysuria. Musculoskeletal:  Negative for myalgias. Skin:  Negative for rash. Allergic/Immunologic: Positive for environmental allergies. Neurological:  Negative for dizziness and headaches. All other systems reviewed and are negative. 14 systems reviewed and negative except as listed in HPI. Objective:     Physical Exam  Vitals (patient sitting comfortably and speaking in complete sentences with no signs of respiratory distress) reviewed. Constitutional:       General: She is not in acute distress. Appearance: Normal appearance. She is not ill-appearing, toxic-appearing or diaphoretic. HENT:      Head: Normocephalic. Right Ear: Hearing, tympanic membrane and ear canal normal. No middle ear effusion. Tympanic membrane is not injected, erythematous or bulging. Left Ear: Hearing, tympanic membrane and ear canal normal.  No middle ear effusion. Tympanic membrane is not injected, erythematous or bulging. Nose: Nose normal. No congestion or rhinorrhea. Right Sinus: No maxillary sinus tenderness or frontal sinus tenderness. Left Sinus: No maxillary sinus tenderness or frontal sinus tenderness. Mouth/Throat:      Lips: Pink. Mouth: Mucous membranes are moist.      Pharynx: Oropharynx is clear. No pharyngeal swelling, oropharyngeal exudate or posterior oropharyngeal erythema. Eyes:      General:         Right eye: No discharge. Left eye: No discharge. Extraocular Movements: Extraocular movements intact. Conjunctiva/sclera: Conjunctivae normal.      Pupils: Pupils are equal, round, and reactive to light. Cardiovascular:      Rate and Rhythm: Regular rhythm. Tachycardia present. Heart sounds: Normal heart sounds. No murmur heard. Pulmonary:      Effort: Pulmonary effort is normal. No respiratory distress. Breath sounds: Normal breath sounds. No stridor. No wheezing, rhonchi or rales. Musculoskeletal:         General: No swelling, tenderness, deformity or signs of injury. Normal range of motion. Cervical back: Normal range of motion and neck supple. No rigidity or tenderness. Right lower leg: No edema. Left lower leg: No edema. Lymphadenopathy:      Cervical: No cervical adenopathy. Skin:     General: Skin is warm and dry. Capillary Refill: Capillary refill takes less than 2 seconds. Findings: No erythema or rash. Neurological:      Mental Status: She is alert and oriented to person, place, and time. Motor: No weakness. Coordination: Coordination normal.      Gait: Gait normal.   Psychiatric:         Mood and Affect: Mood normal.         Behavior: Behavior normal.         Thought Content: Thought content normal.         Judgment: Judgment normal.     BP (!) 133/99 (Site: Left Upper Arm, Position: Sitting, Cuff Size: Medium Adult)   Pulse (!) 103   Ht 5' 5\" (1.651 m)   Wt 144 lb (65.3 kg)   SpO2 100%   BMI 23.96 kg/m²     Assessment:       Diagnosis Orders   1. COVID-19 long hauler manifesting chronic cough  benzonatate (TESSALON) 200 MG capsule      2. Costochondritis            Plan:   -Patient is very well appearing  -Patient is sitting comfortably with no signs of respiratory distress  -Based on Hx and clinical examination, I believe patient is experiencing chronic cough from COVID 19  -Chest pressure most likely musculoskeletal or costochondritis from persistent cough  -Instructed to increase fluid intake.   -Suggested humidifier and mist therapy.  -Avoid irritants such as smoke. -Tessalon for cough  -Advised to go to ER if chest pain or SOB worsens      Return if symptoms worsen or fail to improve.     Orders Placed This Encounter   Medications    benzonatate (TESSALON) 200 MG capsule     Sig: Take 1 capsule by mouth 3 times daily as needed for Cough     Dispense:

## 2022-09-16 ENCOUNTER — OFFICE VISIT (OUTPATIENT)
Dept: FAMILY MEDICINE CLINIC | Age: 27
End: 2022-09-16
Payer: COMMERCIAL

## 2022-09-16 VITALS
WEIGHT: 140 LBS | BODY MASS INDEX: 23.3 KG/M2 | OXYGEN SATURATION: 96 % | DIASTOLIC BLOOD PRESSURE: 60 MMHG | SYSTOLIC BLOOD PRESSURE: 100 MMHG | HEART RATE: 84 BPM

## 2022-09-16 DIAGNOSIS — F90.9 ATTENTION DEFICIT HYPERACTIVITY DISORDER (ADHD), UNSPECIFIED ADHD TYPE: Primary | ICD-10-CM

## 2022-09-16 PROCEDURE — 99213 OFFICE O/P EST LOW 20 MIN: CPT | Performed by: NURSE PRACTITIONER

## 2022-09-16 RX ORDER — ALBUTEROL SULFATE 90 UG/1
2 AEROSOL, METERED RESPIRATORY (INHALATION) PRN
Qty: 18 G | Refills: 5 | Status: SHIPPED | OUTPATIENT
Start: 2022-09-16

## 2022-10-05 DIAGNOSIS — F90.2 ATTENTION DEFICIT HYPERACTIVITY DISORDER (ADHD), COMBINED TYPE: ICD-10-CM

## 2022-10-05 RX ORDER — LISDEXAMFETAMINE DIMESYLATE 40 MG/1
1 CAPSULE ORAL DAILY
Qty: 30 CAPSULE | Refills: 0 | OUTPATIENT
Start: 2022-10-05 | End: 2022-11-04

## 2022-10-05 RX ORDER — ALBUTEROL SULFATE 90 UG/1
2 AEROSOL, METERED RESPIRATORY (INHALATION) PRN
Qty: 18 G | Refills: 5 | OUTPATIENT
Start: 2022-10-05

## 2022-11-17 ENCOUNTER — TELEMEDICINE (OUTPATIENT)
Dept: FAMILY MEDICINE CLINIC | Age: 27
End: 2022-11-17
Payer: COMMERCIAL

## 2022-11-17 DIAGNOSIS — F90.2 ATTENTION DEFICIT HYPERACTIVITY DISORDER (ADHD), COMBINED TYPE: Primary | ICD-10-CM

## 2022-11-17 PROCEDURE — 99213 OFFICE O/P EST LOW 20 MIN: CPT | Performed by: NURSE PRACTITIONER

## 2022-11-17 ASSESSMENT — ENCOUNTER SYMPTOMS
SHORTNESS OF BREATH: 0
COUGH: 0

## 2022-11-17 NOTE — PROGRESS NOTES
Patient is present to discuss ADHD medication  States she feels the vyvanse is too strong  States she has trouble falling asleep

## 2022-11-17 NOTE — PROGRESS NOTES
Cielo Best (:  1995) is a Established patient, here for evaluation of the following:    Assessment & Plan   Below is the assessment and plan developed based on review of pertinent history, physical exam, labs, studies, and medications. 1. Attention deficit hyperactivity disorder (ADHD), combined type  -     lisdexamfetamine (VYVANSE) 30 MG capsule; Take 1 capsule by mouth every morning for 30 days. , Disp-30 capsule, R-0Normal  No follow-ups on file. Subjective   ADHD  Pertinent negatives include no chest pain, chills, coughing or fever. Would like to go on lower dose of vyvanse. Feels the 40 is too strong. Trouble sleeping sometimes. Trouble eating sometimes. Willing to try 30 mg for a month and see if this helps her better. Review of Systems   Constitutional:  Negative for chills and fever. Respiratory:  Negative for cough and shortness of breath. Cardiovascular:  Negative for chest pain, palpitations and leg swelling.         Objective   Patient-Reported Vitals  No data recorded     Physical Exam  [INSTRUCTIONS:  \"[x]\" Indicates a positive item  \"[]\" Indicates a negative item  -- DELETE ALL ITEMS NOT EXAMINED]    Constitutional: [x] Appears well-developed and well-nourished [x] No apparent distress      [] Abnormal -     Mental status: [x] Alert and awake  [x] Oriented to person/place/time [x] Able to follow commands    [] Abnormal -     Eyes:   EOM    [x]  Normal    [] Abnormal -   Sclera  [x]  Normal    [] Abnormal -          Discharge [x]  None visible   [] Abnormal -     HENT: [x] Normocephalic, atraumatic  [] Abnormal -   [x] Mouth/Throat: Mucous membranes are moist    External Ears [x] Normal  [] Abnormal -    Neck: [x] No visualized mass [] Abnormal -     Pulmonary/Chest: [x] Respiratory effort normal   [x] No visualized signs of difficulty breathing or respiratory distress        [] Abnormal -      Musculoskeletal:   [x] Normal gait with no signs of ataxia         [x] Normal range of motion of neck        [] Abnormal -     Neurological:        [x] No Facial Asymmetry (Cranial nerve 7 motor function) (limited exam due to video visit)          [x] No gaze palsy        [] Abnormal -          Skin:        [x] No significant exanthematous lesions or discoloration noted on facial skin         [] Abnormal -            Psychiatric:       [x] Normal Affect [] Abnormal -        [x] No Hallucinations    Other pertinent observable physical exam findings:-           Luz Feliz, was evaluated through a synchronous (real-time) audio-video encounter. The patient (or guardian if applicable) is aware that this is a billable service, which includes applicable co-pays. This Virtual Visit was conducted with patient's (and/or legal guardian's) consent. The visit was conducted pursuant to the emergency declaration under the [address] [place] [address] authority and the [employer] General Act. Patient identification was verified, and a caregiver was present when appropriate. The patient was located at Home: [place] [address].    Provider was located at [facility] ([facility] ADRIANA Krueger - CNP

## 2022-11-18 DIAGNOSIS — R30.9 PAINFUL URINATION: Primary | ICD-10-CM

## 2022-11-22 ENCOUNTER — TELEPHONE (OUTPATIENT)
Dept: FAMILY MEDICINE CLINIC | Age: 27
End: 2022-11-22

## 2022-11-22 DIAGNOSIS — R10.2 PELVIC PAIN IN FEMALE: ICD-10-CM

## 2022-11-22 DIAGNOSIS — R14.0 BLOATING: Primary | ICD-10-CM

## 2022-11-22 NOTE — TELEPHONE ENCOUNTER
Patient called through Nurse Triage complaining of abd cramping. States she was treated recently for a UTI and was told if she is still having symptoms, to follow up with pcp. Explained to her that we do not have any available appts coming up. Advised to go to walk in. Patient verbalized understanding.

## 2022-11-23 ENCOUNTER — HOSPITAL ENCOUNTER (OUTPATIENT)
Dept: ULTRASOUND IMAGING | Facility: CLINIC | Age: 27
Discharge: HOME | End: 2022-11-25
Payer: COMMERCIAL

## 2022-11-23 DIAGNOSIS — R10.2 PELVIC PAIN IN FEMALE: ICD-10-CM

## 2022-11-23 DIAGNOSIS — R14.0 BLOATING: ICD-10-CM

## 2022-11-23 PROCEDURE — 76856 US EXAM PELVIC COMPLETE: CPT

## 2022-11-23 PROCEDURE — 76830 TRANSVAGINAL US NON-OB: CPT

## 2022-12-22 ENCOUNTER — TELEMEDICINE (OUTPATIENT)
Dept: FAMILY MEDICINE CLINIC | Age: 27
End: 2022-12-22
Payer: COMMERCIAL

## 2022-12-22 DIAGNOSIS — F90.9 ATTENTION DEFICIT HYPERACTIVITY DISORDER (ADHD), UNSPECIFIED ADHD TYPE: Primary | ICD-10-CM

## 2022-12-22 PROCEDURE — 99213 OFFICE O/P EST LOW 20 MIN: CPT | Performed by: NURSE PRACTITIONER

## 2022-12-22 NOTE — PROGRESS NOTES
Jonatan Rojo (: 1995) is a 32 y.o. female is here for evaluation of the following chief complaint(s): ADHD and 1 Month Follow-Up    Assessment/Plan:   1. Attention deficit hyperactivity disorder (ADHD), unspecified ADHD type  -     lisdexamfetamine (VYVANSE) 30 MG capsule; Take 1 capsule by mouth daily for 30 days. Max Daily Amount: 30 mg, Disp-30 capsule, R-0Normal  -     lisdexamfetamine (VYVANSE) 30 MG capsule; Take 1 capsule by mouth daily for 30 days. Max Daily Amount: 30 mg, Disp-30 capsule, R-0Normal  -     lisdexamfetamine (VYVANSE) 30 MG capsule; Take 1 capsule by mouth daily for 30 days. Max Daily Amount: 30 mg, Disp-30 capsule, R-0Normal  Return in 3 months (on 3/22/2023). Subjective/Objective:   Since last visit: no change. Medication compliance: all of the time. Side effects from medication include: none.  has been reviewed. Decrease dose of med is working well. Exam:  General: alert, appears stated age, and cooperative  Neuro: no gross deficits  Mental Status exam: normal mood, behavior, and thought processes, able to follow commands  Patient-Reported Vitals  No data recorded     Jonatan Rojo, was evaluated through a synchronous (real-time) audio-video encounter. The patient (or guardian if applicable) is aware that this is a billable service, which includes applicable co-pays. This Virtual Visit was conducted with patient's (and/or legal guardian's) consent. The visit was conducted pursuant to the emergency declaration under the [facility], [address] [place] [address] authority and the [employer] General Act. Patient identification was verified, and a caregiver was present when appropriate. The patient was located at Home: [place] [address]. Provider was located at Home ([address]: [place]. An electronic signature was used to authenticate this note.   -- Rambo Jarrell, APRN - CNP

## 2023-02-01 DIAGNOSIS — F90.9 ATTENTION DEFICIT HYPERACTIVITY DISORDER (ADHD), UNSPECIFIED ADHD TYPE: ICD-10-CM

## 2023-03-13 DIAGNOSIS — F90.9 ATTENTION DEFICIT HYPERACTIVITY DISORDER (ADHD), UNSPECIFIED ADHD TYPE: ICD-10-CM

## 2023-03-19 DIAGNOSIS — N94.6 DYSMENORRHEA: ICD-10-CM

## 2023-03-19 DIAGNOSIS — F90.9 ATTENTION DEFICIT HYPERACTIVITY DISORDER (ADHD), UNSPECIFIED ADHD TYPE: ICD-10-CM

## 2023-03-20 RX ORDER — NORGESTIMATE AND ETHINYL ESTRADIOL 7DAYSX3 28
1 KIT ORAL DAILY
Qty: 28 TABLET | Refills: 12 | OUTPATIENT
Start: 2023-03-20

## 2023-03-20 RX ORDER — NORGESTIMATE AND ETHINYL ESTRADIOL 7DAYSX3 28
KIT ORAL
Qty: 28 TABLET | Refills: 12 | Status: SHIPPED | OUTPATIENT
Start: 2023-03-20

## 2023-03-20 RX ORDER — LISDEXAMFETAMINE DIMESYLATE 30 MG/1
CAPSULE ORAL
Qty: 30 CAPSULE | OUTPATIENT
Start: 2023-03-20

## 2023-03-20 NOTE — TELEPHONE ENCOUNTER
Last visit: 12/22/2022  Last Med refill: 02/22/2023  Does patient have enough medication for 72 hours: yes    Next Visit Date:  Future Appointments   Date Time Provider Carine Martinez   3/23/2023 12:15 PM ADRIANA Merida CNP  [facility]   3/28/2023  2:45 PM Tierra Lees MD [facility] Maintenance   Topic Date Due    Varicella vaccine (1 of 2 - 2-dose childhood series) Never done    Depression Screen  03/25/2023    Pap smear  03/24/2025    DTaP/Tdap/Td vaccine (7 - Td or Tdap) 05/19/2026    Hib vaccine  Completed    Flu vaccine  Completed    COVID-19 Vaccine  Completed    Hepatitis C screen  Completed    HIV screen  Completed    Hepatitis A vaccine  Aged Out    Meningococcal (ACWY) vaccine  Aged Out    Pneumococcal 0-64 years Vaccine  Aged Out       No results found for: LABA1C          ( goal A1C is < 7)   No results found for: LABMICR  LDL Cholesterol (mg/dL)   Date Value   03/29/2022 77       (goal LDL is <100)   AST (U/L)   Date Value   07/08/2022 17     ALT (U/L)   Date Value   07/08/2022 14     BUN (mg/dL)   Date Value   03/29/2022 18     BP Readings from Last 3 Encounters:   09/16/22 100/60   09/13/22 (!) 133/99   06/17/22 110/70          (goal 120/80)    All Future Testing planned in [facility]  Lab Frequency Next Occurrence   Varicella Zoster Antibody, IgG Once 07/08/2022   Culture, Urine Once 11/18/2022   Urinalysis Once 11/18/2022               There is no problem list on file for this patient.

## 2023-03-22 SDOH — ECONOMIC STABILITY: FOOD INSECURITY: WITHIN THE PAST 12 MONTHS, THE FOOD YOU BOUGHT JUST DIDN'T LAST AND YOU DIDN'T HAVE MONEY TO GET MORE.: NEVER TRUE

## 2023-03-22 SDOH — ECONOMIC STABILITY: FOOD INSECURITY: WITHIN THE PAST 12 MONTHS, YOU WORRIED THAT YOUR FOOD WOULD RUN OUT BEFORE YOU GOT MONEY TO BUY MORE.: NEVER TRUE

## 2023-03-22 SDOH — ECONOMIC STABILITY: INCOME INSECURITY: HOW HARD IS IT FOR YOU TO PAY FOR THE VERY BASICS LIKE FOOD, HOUSING, MEDICAL CARE, AND HEATING?: NOT VERY HARD

## 2023-03-22 SDOH — ECONOMIC STABILITY: TRANSPORTATION INSECURITY
IN THE PAST 12 MONTHS, HAS LACK OF TRANSPORTATION KEPT YOU FROM MEETINGS, WORK, OR FROM GETTING THINGS NEEDED FOR DAILY LIVING?: NO

## 2023-03-22 SDOH — ECONOMIC STABILITY: HOUSING INSECURITY
IN THE LAST 12 MONTHS, WAS THERE A TIME WHEN YOU DID NOT HAVE A STEADY PLACE TO SLEEP OR SLEPT IN A SHELTER (INCLUDING NOW)?: NO

## 2023-03-23 ENCOUNTER — TELEMEDICINE (OUTPATIENT)
Dept: FAMILY MEDICINE CLINIC | Age: 28
End: 2023-03-23
Payer: COMMERCIAL

## 2023-03-23 DIAGNOSIS — F90.9 ATTENTION DEFICIT HYPERACTIVITY DISORDER (ADHD), UNSPECIFIED ADHD TYPE: Primary | ICD-10-CM

## 2023-03-23 DIAGNOSIS — Z76.0 MEDICATION REFILL: ICD-10-CM

## 2023-03-23 PROCEDURE — 99213 OFFICE O/P EST LOW 20 MIN: CPT | Performed by: NURSE PRACTITIONER

## 2023-03-23 RX ORDER — SUMATRIPTAN 25 MG/1
25 TABLET, FILM COATED ORAL
Qty: 9 TABLET | Refills: 2 | Status: SHIPPED | OUTPATIENT
Start: 2023-03-23 | End: 2023-03-23

## 2023-03-23 RX ORDER — ONDANSETRON 4 MG/1
4 TABLET, ORALLY DISINTEGRATING ORAL 3 TIMES DAILY PRN
Qty: 21 TABLET | Refills: 0 | Status: SHIPPED | OUTPATIENT
Start: 2023-03-23

## 2023-03-23 ASSESSMENT — PATIENT HEALTH QUESTIONNAIRE - PHQ9
SUM OF ALL RESPONSES TO PHQ QUESTIONS 1-9: 0
SUM OF ALL RESPONSES TO PHQ QUESTIONS 1-9: 0
2. FEELING DOWN, DEPRESSED OR HOPELESS: 0
SUM OF ALL RESPONSES TO PHQ QUESTIONS 1-9: 0
SUM OF ALL RESPONSES TO PHQ9 QUESTIONS 1 & 2: 0
1. LITTLE INTEREST OR PLEASURE IN DOING THINGS: 0
SUM OF ALL RESPONSES TO PHQ QUESTIONS 1-9: 0

## 2023-03-23 NOTE — PROGRESS NOTES
Apurva Samuel (: 1995) is a 32 y.o. female is here for evaluation of the following chief complaint(s): ADHD and Medication Refill    Assessment/Plan:   1. Attention deficit hyperactivity disorder (ADHD), unspecified ADHD type  -     Lisdexamfetamine Dimesylate (VYVANSE) 20 MG CAPS; Take 1 capsule by mouth daily for 7 days. Max Daily Amount: 20 mg, Disp-7 capsule, R-0Normal  2. Medication refill  -     ondansetron (ZOFRAN-ODT) 4 MG disintegrating tablet; Take 1 tablet by mouth 3 times daily as needed for Nausea or Vomiting, Disp-21 tablet, R-0Normal  -     SUMAtriptan (IMITREX) 25 MG tablet; Take 1 tablet by mouth once as needed for Migraine, Disp-9 tablet, R-2Normal    No follow-ups on file. Subjective/Objective:   Since last visit: no change. Medication compliance: weekends and school holidays off. Side effects from medication include: none.  has been reviewed. Pt has some concerns that she isn't able to relax when she gets home from work [employer] med is still working. She would like to go down to 20's to see if she gets a more desired effect. She will message me on [employer] next Thursday/Friday to let me know how it's going        [] OTHER:    Constitutional: [x] Appears well-developed and well-nourished [] No apparent distress                            [] Abnormal-   Mental status  [x] Alert and awake  [x] Oriented to person/place/time [x]Able to follow commands       Eyes:  EOM    [x]  Normal  [] Abnormal-  Sclera  [x]  Normal  [] Abnormal -         Discharge [x]  None visible  [] Abnormal -     HENT:   [x] Normocephalic, atraumatic.   [] Abnormal   [] Mouth/Throat: Mucous membranes are moist.      External Ears [x] Normal  [] Abnormal-      Neck: [x] No visualized mass      Pulmonary/Chest: [x] Respiratory effort normal.  [] No visualized signs of difficulty breathing or respiratory distress        [] Abnormal-      Musculoskeletal:   [] Normal gait with no signs of ataxia         [x] Normal

## 2023-03-28 ENCOUNTER — OFFICE VISIT (OUTPATIENT)
Dept: OBGYN CLINIC | Age: 28
End: 2023-03-28
Payer: COMMERCIAL

## 2023-03-28 VITALS
HEART RATE: 83 BPM | SYSTOLIC BLOOD PRESSURE: 127 MMHG | BODY MASS INDEX: 23.99 KG/M2 | HEIGHT: 65 IN | DIASTOLIC BLOOD PRESSURE: 76 MMHG | WEIGHT: 144 LBS

## 2023-03-28 DIAGNOSIS — Z01.419 ENCOUNTER FOR WELL WOMAN EXAM WITH ROUTINE GYNECOLOGICAL EXAM: Primary | ICD-10-CM

## 2023-03-28 PROCEDURE — 99395 PREV VISIT EST AGE 18-39: CPT | Performed by: OBSTETRICS & GYNECOLOGY

## 2023-03-28 ASSESSMENT — ENCOUNTER SYMPTOMS
ABDOMINAL PAIN: 0
BACK PAIN: 0
COUGH: 0
SHORTNESS OF BREATH: 0

## 2023-03-28 NOTE — PROGRESS NOTES
[address] [facility]  [facility] - [address]  [address]  Dept: [phone]    Chief complaint:   Chief Complaint   Patient presents with    Gynecologic Exam     Last pap 3/24/22 [facility] - HPV       History Present Illness: Mitch Sanders is a 31 yo female who presents for her annual exam.  She is doing well and denies any GYN complaints. She has been having increasing acne recently. She is sexually active with her  and denies any dyspareunia. She denies any vaginal discharge or irritation. She is doing well on her OCPs. She denies any bowel or bladder issues. Current Medications (OTC/Herbal):   Current Outpatient Medications   Medication Sig Dispense Refill    Norgestim-Eth Estrad Triphasic 0.18/0.215/0.25 MG-35 MCG TABS take 1 tablet by mouth once daily 28 tablet 12    lisdexamfetamine (VYVANSE) 30 MG capsule Take 1 capsule by mouth daily for 30 days. Max Daily Amount: 30 mg 30 capsule 0    albuterol sulfate HFA (PROVENTIL;VENTOLIN;PROAIR) 108 (90 Base) MCG/ACT inhaler Inhale 2 puffs into the lungs as needed for Wheezing or Shortness of Breath 18 g 5    Fluticasone Propionate (FLONASE NA) by Nasal route      ondansetron (ZOFRAN-ODT) 4 MG disintegrating tablet Take 1 tablet by mouth 3 times daily as needed for Nausea or Vomiting 21 tablet 0    SUMAtriptan (IMITREX) 25 MG tablet Take 1 tablet by mouth once as needed for Migraine 9 tablet 2    Lisdexamfetamine Dimesylate (VYVANSE) 20 MG CAPS Take 1 capsule by mouth daily for 7 days. Max Daily Amount: 20 mg 7 capsule 0    lisdexamfetamine (VYVANSE) 30 MG capsule Take 1 capsule by mouth daily for 30 days. Max Daily Amount: 30 mg 30 capsule 0    lisdexamfetamine (VYVANSE) 30 MG capsule Take 1 capsule by mouth daily for 30 days. Max Daily Amount: 30 mg 30 capsule 0    lisdexamfetamine (VYVANSE) 30 MG capsule Take 1 capsule by mouth every morning for 30 days.  30 capsule 0     No current

## 2023-04-25 ENCOUNTER — PATIENT MESSAGE (OUTPATIENT)
Dept: FAMILY MEDICINE CLINIC | Age: 28
End: 2023-04-25

## 2023-04-25 DIAGNOSIS — N62 LARGE BREASTS: Primary | ICD-10-CM

## 2023-04-25 NOTE — TELEPHONE ENCOUNTER
From: Emily Doty  To: Cici Henriquez  Sent: 4/25/2023 8:05 AM EDT  Subject: Referral     Hi Allisan! I have been battling severe neck and back pain for almost a decade now, and actually considered a breast reduction when I was 19, but ultimately decided against it because of my age. Well, Im revisiting the idea because of how bad the pain has gotten. I am scheduled to see Dr. Tristen Owens for a breast reduction consult in may 10 and was wondering if you could send over a referral for me? Thank you for anything help you can provide. I have there fax number which is [phone].  Have a nice day!    -Emily Doty

## 2023-05-08 DIAGNOSIS — F90.9 ATTENTION DEFICIT HYPERACTIVITY DISORDER (ADHD), UNSPECIFIED ADHD TYPE: ICD-10-CM

## 2023-06-08 ENCOUNTER — TELEMEDICINE (OUTPATIENT)
Dept: FAMILY MEDICINE CLINIC | Age: 28
End: 2023-06-08
Payer: COMMERCIAL

## 2023-06-08 DIAGNOSIS — F90.2 ATTENTION DEFICIT HYPERACTIVITY DISORDER (ADHD), COMBINED TYPE: Primary | ICD-10-CM

## 2023-06-08 PROCEDURE — 99213 OFFICE O/P EST LOW 20 MIN: CPT | Performed by: NURSE PRACTITIONER

## 2023-06-08 NOTE — PROGRESS NOTES
Emily Doty (: 1995) is a 32 y.o. female is here for evaluation of the following chief complaint(s): ADHD    Assessment/Plan:   1. Attention deficit hyperactivity disorder (ADHD), combined type  -     lisdexamfetamine (VYVANSE) 30 MG capsule; Take 1 capsule by mouth daily for 30 days. Max Daily Amount: 30 mg, Disp-30 capsule, R-0Normal  -     lisdexamfetamine (VYVANSE) 30 MG capsule; Take 1 capsule by mouth daily for 30 days. Max Daily Amount: 30 mg, Disp-30 capsule, R-0Normal    No follow-ups on file. Subjective/Objective:   Since last visit: no change. Medication compliance: all of the time. Side effects from medication include: none.  has been reviewed. Tried to go down on dose of vyvanse but didn't feel it kept her as focused. May try to go down in a few months. Patient-Reported Vitals  No data recorded     PHYSICAL EXAMINATION:     Constitutional: [x] Appears well-developed and well-nourished [] No apparent distress                            [] Abnormal-   Mental status  [x] Alert and awake  [x] Oriented to person/place/time [x]Able to follow commands       Eyes:  EOM    [x]  Normal  [] Abnormal-  Sclera  [x]  Normal  [] Abnormal -         Discharge [x]  None visible  [] Abnormal -     HENT:   [x] Normocephalic, atraumatic.   [] Abnormal   [] Mouth/Throat: Mucous membranes are moist.      External Ears [x] Normal  [] Abnormal-      Neck: [x] No visualized mass      Pulmonary/Chest: [x] Respiratory effort normal.  [] No visualized signs of difficulty breathing or respiratory distress        [] Abnormal-      Musculoskeletal:   [] Normal gait with no signs of ataxia         [x] Normal range of motion of neck        [] Abnormal-   [] Motor grossly intact in visible upper extremities    [] Motor grossly intact in visible lower extremities        Neurological:        [x] No Facial Asymmetry (Cranial nerve 7 motor function) (limited exam to video visit)                       [x] No gaze

## 2023-07-11 ENCOUNTER — OFFICE VISIT (OUTPATIENT)
Dept: FAMILY MEDICINE CLINIC | Age: 28
End: 2023-07-11
Payer: COMMERCIAL

## 2023-07-11 VITALS
BODY MASS INDEX: 23.13 KG/M2 | OXYGEN SATURATION: 100 % | SYSTOLIC BLOOD PRESSURE: 130 MMHG | WEIGHT: 139 LBS | DIASTOLIC BLOOD PRESSURE: 80 MMHG | HEART RATE: 95 BPM

## 2023-07-11 DIAGNOSIS — Z76.0 MEDICATION REFILL: ICD-10-CM

## 2023-07-11 DIAGNOSIS — F90.2 ATTENTION DEFICIT HYPERACTIVITY DISORDER (ADHD), COMBINED TYPE: ICD-10-CM

## 2023-07-11 DIAGNOSIS — Z00.00 WELL ADULT EXAM: Primary | ICD-10-CM

## 2023-07-11 DIAGNOSIS — F90.9 ATTENTION DEFICIT HYPERACTIVITY DISORDER (ADHD), UNSPECIFIED ADHD TYPE: ICD-10-CM

## 2023-07-11 PROCEDURE — 99395 PREV VISIT EST AGE 18-39: CPT | Performed by: NURSE PRACTITIONER

## 2023-07-11 RX ORDER — SUMATRIPTAN 25 MG/1
25 TABLET, FILM COATED ORAL
Qty: 9 TABLET | Refills: 2 | Status: SHIPPED | OUTPATIENT
Start: 2023-07-11 | End: 2023-07-11

## 2023-07-11 RX ORDER — ONDANSETRON 4 MG/1
4 TABLET, ORALLY DISINTEGRATING ORAL 3 TIMES DAILY PRN
Qty: 21 TABLET | Refills: 0 | Status: SHIPPED | OUTPATIENT
Start: 2023-07-11

## 2023-07-11 ASSESSMENT — ENCOUNTER SYMPTOMS
SHORTNESS OF BREATH: 0
COUGH: 0

## 2023-07-11 NOTE — PROGRESS NOTES
Well Adult Note  Name: Kt Lamas Date: 2023   MRN: [id number] Sex: Female   Age: 25 y.o. Ethnicity:  / [place]   : 1995 Race: Other      Debi Pascal is here for well adult exam.    Review of Systems   Constitutional:  Negative for chills and fever. Respiratory:  Negative for cough and shortness of breath. Cardiovascular:  Negative for chest pain, palpitations and leg swelling. Allergies   Allergen Reactions    Other     Seasonal        Prior to Visit Medications    Medication Sig Taking? Authorizing Provider   SUMAtriptan (IMITREX) 25 MG tablet Take 1 tablet by mouth once as needed for Migraine Yes Cindy Rye, APRN - CNP   ondansetron (ZOFRAN-ODT) 4 MG disintegrating tablet Take 1 tablet by mouth 3 times daily as needed for Nausea or Vomiting Yes Cindy Rye, APRN - CNP   lisdexamfetamine (VYVANSE) 30 MG capsule Take 1 capsule by mouth daily for 30 days. Max Daily Amount: 30 mg Yes Cindy Rye, APRN - CNP   lisdexamfetamine (VYVANSE) 30 MG capsule Take 1 capsule by mouth daily for 30 days. Max Daily Amount: 30 mg Yes Cindy Rye, APRN - CNP   lisdexamfetamine (VYVANSE) 30 MG capsule Take 1 capsule by mouth daily for 30 days. Max Daily Amount: 30 mg Yes Cindy Rye, APRN - CNP   Norgestim-Eth Estrad Triphasic 0.18/0.215/0.25 MG-35 MCG TABS take 1 tablet by mouth once daily Yes Heriberto Marie DO   albuterol sulfate HFA (PROVENTIL;VENTOLIN;PROAIR) 108 (90 Base) MCG/ACT inhaler Inhale 2 puffs into the lungs as needed for Wheezing or Shortness of Breath Yes Cindy Rye, APRN - CNP   Fluticasone Propionate (FLONASE NA) by Nasal route Yes Historical Provider, MD   lisdexamfetamine (VYVANSE) 30 MG capsule Take 1 capsule by mouth daily for 30 days. Max Daily Amount: 30 mg  Cindy Rye, APRN - CNP   lisdexamfetamine (VYVANSE) 30 MG capsule Take 1 capsule by mouth daily for 30 days.  Max Daily Amount: 30 mg  Claudian S

## 2023-07-16 ENCOUNTER — PATIENT MESSAGE (OUTPATIENT)
Dept: FAMILY MEDICINE CLINIC | Age: 28
End: 2023-07-16

## 2023-07-17 ENCOUNTER — OFFICE VISIT (OUTPATIENT)
Dept: FAMILY MEDICINE CLINIC | Age: 28
End: 2023-07-17
Payer: COMMERCIAL

## 2023-07-17 VITALS
SYSTOLIC BLOOD PRESSURE: 128 MMHG | BODY MASS INDEX: 22.56 KG/M2 | WEIGHT: 135.4 LBS | DIASTOLIC BLOOD PRESSURE: 80 MMHG | HEART RATE: 93 BPM | RESPIRATION RATE: 20 BRPM | HEIGHT: 65 IN | OXYGEN SATURATION: 99 %

## 2023-07-17 DIAGNOSIS — R10.13 MIDEPIGASTRIC PAIN: Primary | ICD-10-CM

## 2023-07-17 DIAGNOSIS — R10.9 ABDOMINAL PAIN, UNSPECIFIED ABDOMINAL LOCATION: Primary | ICD-10-CM

## 2023-07-17 PROCEDURE — 99214 OFFICE O/P EST MOD 30 MIN: CPT | Performed by: NURSE PRACTITIONER

## 2023-07-17 RX ORDER — FAMOTIDINE 20 MG/1
20 TABLET, FILM COATED ORAL 2 TIMES DAILY
COMMUNITY
Start: 2023-07-16

## 2023-07-17 RX ORDER — PANTOPRAZOLE SODIUM 40 MG/1
40 TABLET, DELAYED RELEASE ORAL EVERY MORNING
COMMUNITY
Start: 2023-07-16

## 2023-07-17 NOTE — PROGRESS NOTES
Ezzie Aschoff (:  1995) is a 25 y.o. female,Established patient, here for evaluation of the following chief complaint(s):  ED Follow-up (Abdominal pain x 8 days-referral to GI created and faxed. /)         ASSESSMENT/PLAN:  1. Midepigastric pain  -     CT ABDOMEN PELVIS W IV CONTRAST; Future  Front office to assist in scheduling stat CT  Continue protonix/famotidine daily   Follow up as scheduled with GI on 2023    MDM  Reviewed: previous chart, nursing note and vitals  Reviewed previous: labs        Return if symptoms worsen or fail to improve. Subjective   SUBJECTIVE/OBJECTIVE:  Presents for Er follow up with   Has lost 4lbs since 2023 physical with PCP     Gabbi NAIK 07/15/2023 mide epigastric abdominal pain on going x1 week. Tried tums, omeprazole, famotidine without improvement in symptoms. Dx with GERD, started on daily protonix and referred back to PCP for follow up    [facility] [place] 2023 Worsening epigastric pain, episode of diarrhea. Pain worse when laying down after eating. Labs completed, no imaging. Told to continue protonix and famotidine with PCP follow up    Reports mid epigastric pain x8 days, not improved  Had diarrhea last night and this morning - feels it is because she is not eating like normal   Has only really been able to eat a peanut butter and jelly sandwhich once, pretty much everything makes her stomach hurt  Appointment with Dr. Isidro Catherine on Wednesday morning           Review of Systems   Constitutional:  Negative for activity change, chills, fatigue and unexpected weight change. HENT:  Negative for hearing loss, postnasal drip, sinus pressure and sinus pain. Eyes:  Negative for pain and visual disturbance. Respiratory:  Negative for chest tightness and shortness of breath. Cardiovascular:  Negative for chest pain and palpitations. Gastrointestinal:  Positive for abdominal pain, diarrhea and nausea. Negative for constipation and vomiting.

## 2023-07-18 ENCOUNTER — HOSPITAL ENCOUNTER (OUTPATIENT)
Dept: CT IMAGING | Age: 28
Discharge: HOME | End: 2023-07-20
Payer: COMMERCIAL

## 2023-07-18 ENCOUNTER — TELEPHONE (OUTPATIENT)
Dept: FAMILY MEDICINE CLINIC | Age: 28
End: 2023-07-18

## 2023-07-18 DIAGNOSIS — R10.13 MIDEPIGASTRIC PAIN: ICD-10-CM

## 2023-07-18 PROCEDURE — 74177 CT ABD & PELVIS W/CONTRAST: CPT

## 2023-07-18 PROCEDURE — 2580000003 HC RX 258: Performed by: NURSE PRACTITIONER

## 2023-07-18 PROCEDURE — 6360000004 HC RX CONTRAST MEDICATION: Performed by: NURSE PRACTITIONER

## 2023-07-18 RX ORDER — 0.9 % SODIUM CHLORIDE 0.9 %
80 INTRAVENOUS SOLUTION INTRAVENOUS ONCE
Status: COMPLETED | OUTPATIENT
Start: 2023-07-18 | End: 2023-07-18

## 2023-07-18 RX ORDER — SODIUM CHLORIDE 0.9 % (FLUSH) 0.9 %
10 SYRINGE (ML) INJECTION PRN
Status: DISCONTINUED | OUTPATIENT
Start: 2023-07-18 | End: 2023-07-21 | Stop reason: HOSPADM

## 2023-07-18 RX ADMIN — SODIUM CHLORIDE 80 ML: 9 INJECTION, SOLUTION INTRAVENOUS at 09:16

## 2023-07-18 RX ADMIN — IOPAMIDOL 75 ML: 755 INJECTION, SOLUTION INTRAVENOUS at 09:15

## 2023-07-18 RX ADMIN — SODIUM CHLORIDE, PRESERVATIVE FREE 10 ML: 5 INJECTION INTRAVENOUS at 09:16

## 2023-07-18 NOTE — TELEPHONE ENCOUNTER
Patient called regarding results of STAT CT. Spoke with Allisan due to GRUMS not being in office and she stated No acute findings. Follow up with GI as scheduled. Patient informed. No further questions.

## 2023-07-20 ASSESSMENT — ENCOUNTER SYMPTOMS
VOMITING: 0
DIARRHEA: 1
BACK PAIN: 0
ABDOMINAL PAIN: 1
SINUS PAIN: 0
CHEST TIGHTNESS: 0
SHORTNESS OF BREATH: 0
SINUS PRESSURE: 0
EYE PAIN: 0
CONSTIPATION: 0
NAUSEA: 1
COLOR CHANGE: 0

## 2023-07-21 ENCOUNTER — TRANSCRIBE ORDERS (OUTPATIENT)
Dept: ADMINISTRATIVE | Age: 28
End: 2023-07-21

## 2023-07-21 DIAGNOSIS — R10.9 ABDOMINAL PAIN, UNSPECIFIED ABDOMINAL LOCATION: Primary | ICD-10-CM

## 2023-07-27 ENCOUNTER — HOSPITAL ENCOUNTER (OUTPATIENT)
Dept: NUCLEAR MEDICINE | Age: 28
Discharge: HOME | End: 2023-07-29
Attending: INTERNAL MEDICINE
Payer: COMMERCIAL

## 2023-07-27 ENCOUNTER — HOSPITAL ENCOUNTER (OUTPATIENT)
Dept: ULTRASOUND IMAGING | Age: 28
Discharge: HOME | End: 2023-07-29
Attending: INTERNAL MEDICINE
Payer: COMMERCIAL

## 2023-07-27 DIAGNOSIS — R10.9 ABDOMINAL PAIN, UNSPECIFIED ABDOMINAL LOCATION: ICD-10-CM

## 2023-07-27 PROCEDURE — 2580000003 HC RX 258: Performed by: INTERNAL MEDICINE

## 2023-07-27 PROCEDURE — A9537 TC99M MEBROFENIN: HCPCS | Performed by: INTERNAL MEDICINE

## 2023-07-27 PROCEDURE — 78227 HEPATOBIL SYST IMAGE W/DRUG: CPT

## 2023-07-27 PROCEDURE — 76705 ECHO EXAM OF ABDOMEN: CPT

## 2023-07-27 PROCEDURE — 3430000000 HC RX DIAGNOSTIC RADIOPHARMACEUTICAL: Performed by: INTERNAL MEDICINE

## 2023-07-27 RX ORDER — SODIUM CHLORIDE 0.9 % (FLUSH) 0.9 %
10 SYRINGE (ML) INJECTION PRN
Status: DISCONTINUED | OUTPATIENT
Start: 2023-07-27 | End: 2023-07-30 | Stop reason: HOSPADM

## 2023-07-27 RX ADMIN — SODIUM CHLORIDE, PRESERVATIVE FREE 10 ML: 5 INJECTION INTRAVENOUS at 07:34

## 2023-07-27 RX ADMIN — Medication 6.7 MILLICURIE: at 07:33

## 2023-08-16 ENCOUNTER — TELEPHONE (OUTPATIENT)
Dept: GASTROENTEROLOGY | Age: 28
End: 2023-08-16

## 2023-08-16 NOTE — TELEPHONE ENCOUNTER
Procedure scheduled/Darvindani  EUS with path  Dx:  LUQ pain  10/3/23   11:30am/arrive 10:00am                  [facility]                  Surgery/MRI entrance near ER    EUS instructions mailed to patient    Patient advised by phone/mail.

## 2023-08-17 ENCOUNTER — NURSE ONLY (OUTPATIENT)
Dept: PRIMARY CARE CLINIC | Age: 28
End: 2023-08-17

## 2023-08-17 ENCOUNTER — HOSPITAL ENCOUNTER (OUTPATIENT)
Age: 28
Setting detail: SPECIMEN
Discharge: HOME | End: 2023-08-17

## 2023-08-17 ENCOUNTER — OFFICE VISIT (OUTPATIENT)
Dept: FAMILY MEDICINE CLINIC | Age: 28
End: 2023-08-17
Payer: COMMERCIAL

## 2023-08-17 VITALS
BODY MASS INDEX: 22.83 KG/M2 | OXYGEN SATURATION: 100 % | WEIGHT: 137.2 LBS | SYSTOLIC BLOOD PRESSURE: 108 MMHG | HEART RATE: 97 BPM | TEMPERATURE: 97.2 F | DIASTOLIC BLOOD PRESSURE: 64 MMHG

## 2023-08-17 DIAGNOSIS — R11.0 NAUSEA: ICD-10-CM

## 2023-08-17 DIAGNOSIS — R10.9 LEFT FLANK PAIN: Primary | ICD-10-CM

## 2023-08-17 DIAGNOSIS — F90.9 ATTENTION DEFICIT HYPERACTIVITY DISORDER (ADHD), UNSPECIFIED ADHD TYPE: ICD-10-CM

## 2023-08-17 DIAGNOSIS — R31.21 ASYMPTOMATIC MICROSCOPIC HEMATURIA: ICD-10-CM

## 2023-08-17 DIAGNOSIS — R10.13 EPIGASTRIC PAIN: Primary | ICD-10-CM

## 2023-08-17 DIAGNOSIS — R35.0 URINARY FREQUENCY: ICD-10-CM

## 2023-08-17 LAB
BILIRUBIN, POC: NEGATIVE
BLOOD URINE, POC: NORMAL
CLARITY, POC: NORMAL
COLOR, POC: NORMAL
GLUCOSE URINE, POC: NEGATIVE
KETONES, POC: NEGATIVE
LEUKOCYTE EST, POC: NEGATIVE
NITRITE, POC: NEGATIVE
PH, POC: 7
PROTEIN, POC: NEGATIVE
SPECIFIC GRAVITY, POC: 1.01
UROBILINOGEN, POC: 0.2

## 2023-08-17 PROCEDURE — 81003 URINALYSIS AUTO W/O SCOPE: CPT | Performed by: NURSE PRACTITIONER

## 2023-08-17 PROCEDURE — 99214 OFFICE O/P EST MOD 30 MIN: CPT | Performed by: NURSE PRACTITIONER

## 2023-08-17 ASSESSMENT — ENCOUNTER SYMPTOMS
DIARRHEA: 0
VOMITING: 0
SINUS PAIN: 0
SINUS PRESSURE: 0
COLOR CHANGE: 0
BACK PAIN: 0
CONSTIPATION: 0
SHORTNESS OF BREATH: 0
EYE PAIN: 0
ABDOMINAL DISTENTION: 1
ABDOMINAL PAIN: 1
NAUSEA: 1
CHEST TIGHTNESS: 0

## 2023-08-18 ENCOUNTER — TELEPHONE (OUTPATIENT)
Dept: FAMILY MEDICINE CLINIC | Age: 28
End: 2023-08-18

## 2023-08-18 ENCOUNTER — HOSPITAL ENCOUNTER (OUTPATIENT)
Age: 28
Setting detail: SPECIMEN
Discharge: HOME | End: 2023-08-18

## 2023-08-18 LAB
BILIRUB UR QL STRIP: NEGATIVE
CLARITY UR: CLEAR
COLOR UR: YELLOW
EPI CELLS #/AREA URNS HPF: NORMAL /HPF (ref 0–5)
GLIADIN IGA SER IA-ACNC: NORMAL U/ML
GLIADIN IGG SER IA-ACNC: NORMAL U/ML
GLUCOSE UR STRIP-MCNC: NEGATIVE MG/DL
HGB UR QL STRIP.AUTO: NEGATIVE
IGA SERPL-MCNC: 232 MG/DL (ref 70–400)
KETONES UR STRIP-MCNC: NEGATIVE MG/DL
LEUKOCYTE ESTERASE UR QL STRIP: NEGATIVE
MICROORGANISM SPEC CULT: NORMAL
NITRITE UR QL STRIP: NEGATIVE
PH UR STRIP: 7.5 [PH] (ref 5–8)
PROT UR STRIP-MCNC: NEGATIVE MG/DL
RBC #/AREA URNS HPF: NORMAL /HPF (ref 0–4)
SP GR UR STRIP: 1.01 (ref 1–1.03)
SPECIMEN DESCRIPTION: NORMAL
TTG IGA SER IA-ACNC: NORMAL U/ML
UROBILINOGEN UR STRIP-ACNC: NORMAL EU/DL (ref 0–1)
WBC #/AREA URNS HPF: NORMAL /HPF (ref 0–5)

## 2023-08-18 NOTE — TELEPHONE ENCOUNTER
----- Message from ADRIANA Pearce NP sent at 8/17/2023  3:31 PM EDT -----  External referral to GI placed - she will need to call main [facility] line for GI to schedule appointment. Let them know she is looking for second opinion. She can provide them with our office number should they require records (also may need to sign release form so we can share that).   Here is their scheduling number: [phone]    Also I do not know if they are within her insurance network, she will need to verify that as well

## 2023-08-21 ENCOUNTER — HOSPITAL ENCOUNTER (OUTPATIENT)
Age: 28
Setting detail: SPECIMEN
Discharge: HOME | End: 2023-08-21

## 2023-08-21 ENCOUNTER — ANESTHESIA EVENT (OUTPATIENT)
Dept: OPERATING ROOM | Age: 28
End: 2023-08-21
Payer: COMMERCIAL

## 2023-08-21 LAB
O+P STL CONC: NORMAL
SPECIMEN DESCRIPTION: NORMAL

## 2023-08-22 ENCOUNTER — HOSPITAL ENCOUNTER (OUTPATIENT)
Age: 28
Setting detail: OUTPATIENT SURGERY
Discharge: HOME | End: 2023-08-22
Attending: INTERNAL MEDICINE | Admitting: INTERNAL MEDICINE
Payer: COMMERCIAL

## 2023-08-22 ENCOUNTER — APPOINTMENT (OUTPATIENT)
Dept: ULTRASOUND IMAGING | Age: 28
End: 2023-08-22
Attending: INTERNAL MEDICINE
Payer: COMMERCIAL

## 2023-08-22 ENCOUNTER — ANESTHESIA (OUTPATIENT)
Dept: OPERATING ROOM | Age: 28
End: 2023-08-22
Payer: COMMERCIAL

## 2023-08-22 VITALS
HEART RATE: 85 BPM | DIASTOLIC BLOOD PRESSURE: 83 MMHG | OXYGEN SATURATION: 100 % | SYSTOLIC BLOOD PRESSURE: 131 MMHG | BODY MASS INDEX: 22.49 KG/M2 | TEMPERATURE: 97.4 F | HEIGHT: 65 IN | RESPIRATION RATE: 16 BRPM | WEIGHT: 135 LBS

## 2023-08-22 DIAGNOSIS — R10.12 LUQ PAIN: ICD-10-CM

## 2023-08-22 DIAGNOSIS — R10.12 LEFT UPPER QUADRANT PAIN: ICD-10-CM

## 2023-08-22 LAB
C DIFFICILE TOXINS, PCR: NORMAL
C PARVUM AG STL QL IA: NEGATIVE
G LAMBLIA AG STL QL IA: NEGATIVE
GLIADIN IGA SER IA-ACNC: 1.8 U/ML
GLIADIN IGG SER IA-ACNC: <0.4 U/ML
HCG, PREGNANCY URINE (POC): NEGATIVE
IGA SERPL-MCNC: 232 MG/DL (ref 70–400)
SOURCE: NORMAL
SPECIMEN DESCRIPTION: NORMAL
SPECIMEN DESCRIPTION: NORMAL
TTG IGA SER IA-ACNC: 0.7 U/ML

## 2023-08-22 PROCEDURE — 2500000003 HC RX 250 WO HCPCS: Performed by: NURSE ANESTHETIST, CERTIFIED REGISTERED

## 2023-08-22 PROCEDURE — 43239 EGD BIOPSY SINGLE/MULTIPLE: CPT | Performed by: INTERNAL MEDICINE

## 2023-08-22 PROCEDURE — 6360000002 HC RX W HCPCS: Performed by: NURSE ANESTHETIST, CERTIFIED REGISTERED

## 2023-08-22 PROCEDURE — 3609018500 HC EGD US SCOPE W/ADJACENT STRUCTURES: Performed by: INTERNAL MEDICINE

## 2023-08-22 PROCEDURE — 3700000001 HC ADD 15 MINUTES (ANESTHESIA): Performed by: INTERNAL MEDICINE

## 2023-08-22 PROCEDURE — 3700000000 HC ANESTHESIA ATTENDED CARE: Performed by: INTERNAL MEDICINE

## 2023-08-22 PROCEDURE — 7100000011 HC PHASE II RECOVERY - ADDTL 15 MIN: Performed by: INTERNAL MEDICINE

## 2023-08-22 PROCEDURE — 3609012400 HC EGD TRANSORAL BIOPSY SINGLE/MULTIPLE: Performed by: INTERNAL MEDICINE

## 2023-08-22 PROCEDURE — 2580000003 HC RX 258: Performed by: ANESTHESIOLOGY

## 2023-08-22 PROCEDURE — 7100000010 HC PHASE II RECOVERY - FIRST 15 MIN: Performed by: INTERNAL MEDICINE

## 2023-08-22 PROCEDURE — 88305 TISSUE EXAM BY PATHOLOGIST: CPT

## 2023-08-22 PROCEDURE — 2709999900 HC NON-CHARGEABLE SUPPLY: Performed by: INTERNAL MEDICINE

## 2023-08-22 PROCEDURE — 81025 URINE PREGNANCY TEST: CPT

## 2023-08-22 PROCEDURE — 43259 EGD US EXAM DUODENUM/JEJUNUM: CPT | Performed by: INTERNAL MEDICINE

## 2023-08-22 PROCEDURE — 76975 GI ENDOSCOPIC ULTRASOUND: CPT | Performed by: INTERNAL MEDICINE

## 2023-08-22 RX ORDER — FENTANYL CITRATE 50 UG/ML
INJECTION, SOLUTION INTRAMUSCULAR; INTRAVENOUS PRN
Status: DISCONTINUED | OUTPATIENT
Start: 2023-08-22 | End: 2023-08-22 | Stop reason: SDUPTHER

## 2023-08-22 RX ORDER — LIDOCAINE HYDROCHLORIDE 10 MG/ML
INJECTION, SOLUTION EPIDURAL; INFILTRATION; INTRACAUDAL; PERINEURAL PRN
Status: DISCONTINUED | OUTPATIENT
Start: 2023-08-22 | End: 2023-08-22 | Stop reason: SDUPTHER

## 2023-08-22 RX ORDER — KETAMINE HCL IN NACL, ISO-OSM 100MG/10ML
SYRINGE (ML) INJECTION PRN
Status: DISCONTINUED | OUTPATIENT
Start: 2023-08-22 | End: 2023-08-22 | Stop reason: SDUPTHER

## 2023-08-22 RX ORDER — GLYCOPYRROLATE 0.2 MG/ML
INJECTION INTRAMUSCULAR; INTRAVENOUS PRN
Status: DISCONTINUED | OUTPATIENT
Start: 2023-08-22 | End: 2023-08-22 | Stop reason: SDUPTHER

## 2023-08-22 RX ORDER — MIDAZOLAM HYDROCHLORIDE 2 MG/2ML
1 INJECTION, SOLUTION INTRAMUSCULAR; INTRAVENOUS EVERY 10 MIN PRN
Status: DISCONTINUED | OUTPATIENT
Start: 2023-08-22 | End: 2023-08-22 | Stop reason: HOSPADM

## 2023-08-22 RX ORDER — ONDANSETRON 2 MG/ML
4 INJECTION INTRAMUSCULAR; INTRAVENOUS
Status: DISCONTINUED | OUTPATIENT
Start: 2023-08-22 | End: 2023-08-22 | Stop reason: HOSPADM

## 2023-08-22 RX ORDER — SODIUM CHLORIDE, SODIUM LACTATE, POTASSIUM CHLORIDE, CALCIUM CHLORIDE 600; 310; 30; 20 MG/100ML; MG/100ML; MG/100ML; MG/100ML
INJECTION, SOLUTION INTRAVENOUS CONTINUOUS
Status: DISCONTINUED | OUTPATIENT
Start: 2023-08-22 | End: 2023-08-22 | Stop reason: HOSPADM

## 2023-08-22 RX ORDER — FENTANYL CITRATE 50 UG/ML
50 INJECTION, SOLUTION INTRAMUSCULAR; INTRAVENOUS
Status: DISCONTINUED | OUTPATIENT
Start: 2023-08-22 | End: 2023-08-22 | Stop reason: HOSPADM

## 2023-08-22 RX ORDER — FENTANYL CITRATE 50 UG/ML
25 INJECTION, SOLUTION INTRAMUSCULAR; INTRAVENOUS EVERY 5 MIN PRN
Status: DISCONTINUED | OUTPATIENT
Start: 2023-08-22 | End: 2023-08-22 | Stop reason: HOSPADM

## 2023-08-22 RX ORDER — PROCHLORPERAZINE EDISYLATE 5 MG/ML
5 INJECTION INTRAMUSCULAR; INTRAVENOUS
Status: DISCONTINUED | OUTPATIENT
Start: 2023-08-22 | End: 2023-08-22 | Stop reason: HOSPADM

## 2023-08-22 RX ORDER — FENTANYL CITRATE 50 UG/ML
25 INJECTION, SOLUTION INTRAMUSCULAR; INTRAVENOUS
Status: DISCONTINUED | OUTPATIENT
Start: 2023-08-22 | End: 2023-08-22 | Stop reason: HOSPADM

## 2023-08-22 RX ORDER — MIDAZOLAM HYDROCHLORIDE 1 MG/ML
INJECTION INTRAMUSCULAR; INTRAVENOUS PRN
Status: DISCONTINUED | OUTPATIENT
Start: 2023-08-22 | End: 2023-08-22 | Stop reason: SDUPTHER

## 2023-08-22 RX ORDER — PROPOFOL 10 MG/ML
INJECTION, EMULSION INTRAVENOUS PRN
Status: DISCONTINUED | OUTPATIENT
Start: 2023-08-22 | End: 2023-08-22 | Stop reason: SDUPTHER

## 2023-08-22 RX ORDER — SODIUM CHLORIDE 0.9 % (FLUSH) 0.9 %
5-40 SYRINGE (ML) INJECTION EVERY 12 HOURS SCHEDULED
Status: DISCONTINUED | OUTPATIENT
Start: 2023-08-22 | End: 2023-08-22 | Stop reason: HOSPADM

## 2023-08-22 RX ORDER — LIDOCAINE HYDROCHLORIDE 10 MG/ML
1 INJECTION, SOLUTION INFILTRATION; PERINEURAL
Status: DISCONTINUED | OUTPATIENT
Start: 2023-08-22 | End: 2023-08-22 | Stop reason: HOSPADM

## 2023-08-22 RX ADMIN — FENTANYL CITRATE 25 MCG: 50 INJECTION, SOLUTION INTRAMUSCULAR; INTRAVENOUS at 15:06

## 2023-08-22 RX ADMIN — SODIUM CHLORIDE, POTASSIUM CHLORIDE, SODIUM LACTATE AND CALCIUM CHLORIDE: 600; 310; 30; 20 INJECTION, SOLUTION INTRAVENOUS at 15:16

## 2023-08-22 RX ADMIN — PROPOFOL 200 MCG/KG/MIN: 10 INJECTION, EMULSION INTRAVENOUS at 14:49

## 2023-08-22 RX ADMIN — Medication 10 MG: at 15:15

## 2023-08-22 RX ADMIN — PROPOFOL 100 MG: 10 INJECTION, EMULSION INTRAVENOUS at 14:57

## 2023-08-22 RX ADMIN — SODIUM CHLORIDE, POTASSIUM CHLORIDE, SODIUM LACTATE AND CALCIUM CHLORIDE: 600; 310; 30; 20 INJECTION, SOLUTION INTRAVENOUS at 12:05

## 2023-08-22 RX ADMIN — FENTANYL CITRATE 25 MCG: 50 INJECTION, SOLUTION INTRAMUSCULAR; INTRAVENOUS at 15:15

## 2023-08-22 RX ADMIN — MIDAZOLAM 2 MG: 1 INJECTION INTRAMUSCULAR; INTRAVENOUS at 14:34

## 2023-08-22 RX ADMIN — PROPOFOL 100 MG: 10 INJECTION, EMULSION INTRAVENOUS at 14:48

## 2023-08-22 RX ADMIN — FENTANYL CITRATE 25 MCG: 50 INJECTION, SOLUTION INTRAMUSCULAR; INTRAVENOUS at 14:58

## 2023-08-22 RX ADMIN — GLYCOPYRROLATE 0.2 MG: 0.2 INJECTION INTRAMUSCULAR; INTRAVENOUS at 14:34

## 2023-08-22 RX ADMIN — Medication 10 MG: at 15:06

## 2023-08-22 RX ADMIN — FENTANYL CITRATE 25 MCG: 50 INJECTION, SOLUTION INTRAMUSCULAR; INTRAVENOUS at 14:32

## 2023-08-22 RX ADMIN — LIDOCAINE HYDROCHLORIDE 50 MG: 10 INJECTION, SOLUTION EPIDURAL; INFILTRATION; INTRACAUDAL; PERINEURAL at 14:37

## 2023-08-22 RX ADMIN — Medication 30 MG: at 14:58

## 2023-08-22 ASSESSMENT — PAIN - FUNCTIONAL ASSESSMENT: PAIN_FUNCTIONAL_ASSESSMENT: 0-10

## 2023-08-22 NOTE — ANESTHESIA POSTPROCEDURE EVALUATION
Department of Anesthesiology  Postprocedure Note    Patient: Gianna Soriano  MRN: [id number]  YOB: 1995  Date of evaluation: 8/22/2023      Procedure Summary     Date: 08/22/23 Room / Location: [facility] 03 / [address]    Anesthesia Start: 1840 Anesthesia Stop: 1623    Procedures:       ENDOSCOPIC ULTRASOUND WITH PATHOLOGY      EGD BIOPSY Diagnosis:       Left upper quadrant pain      (Left upper quadrant pain [R10.12])    Surgeons: Karina Acuna MD Responsible Provider: Bk Washington MD    Anesthesia Type: MAC ASA Status: 2          Anesthesia Type: No value filed.     Darrell Phase I:      Darrell Phase II:        Anesthesia Post Evaluation    Patient location during evaluation: PACU  Patient participation: complete - patient participated  Level of consciousness: awake and alert  Airway patency: patent  Nausea & Vomiting: no nausea and no vomiting  Complications: no  Cardiovascular status: blood pressure returned to baseline  Respiratory status: acceptable  Hydration status: euvolemic  Comments: No known anesthesia related complication  Multimodal analgesia pain management approach  Pain management: adequate

## 2023-08-22 NOTE — OP NOTE
Operative Note      Patient: Chelsie Clarke  YOB: 1995  MRN: [id number]    Date of Procedure: 8/22/2023    Pre-Op Diagnosis Codes:     * Left upper quadrant pain [R10.12]    Post-Op Diagnosis:  Normal exam       Procedure(s):  ENDOSCOPIC ULTRASOUND WITH PATHOLOGY  EGD BIOPSY    Surgeon(s):  Devika Garay MD    Assistant:   First Assistant: Siena Mantilla RN    Anesthesia: Monitor Anesthesia Care    Estimated Blood Loss (mL): Minimal    Complications: None    Specimens:   ID Type Source Tests Collected by Time Destination   A : duodenal bx  Tissue Duodenum SURGICAL PATHOLOGY Devika Garay MD 8/22/2023 1455        Implants:  * No implants in log *      Drains: * No LDAs found *      Description of Procedure:  Informed consent was obtained from the patient after explanation of the procedure including indications, description of the procedure,  benefits and possible risks and complications of the procedure, and alternatives. Questions were answered. The patient's history was reviewed and a directed physical examination was performed prior to the procedure. Patient was monitored throughout the procedure with pulse oximetry and periodic assessment of vital signs. Patient was sedated as noted above. With the patient in the left lateral decubitus position, the Olympus videoendoscope followed by endoechoendoscope was placed in the patient's mouth and under direct visualization passed into the esophagus. The scope was passed to the 2nd portion of the duodenum. The patient tolerated the procedure well and was taken to the recovery area in good condition. EGD Findings[de-identified]   Esophagus: normal.   Stomach: Normal stomach seen during direct and retroflexion view. Duodenum: Normal.  Biopsied to rule out celiac disease    EUS findings:  Pancreas: Scattered hyperechoic strands seen in the body of the pancreas. PD with hyperechoic ductal wall.   PD in the head 1.5 mm in the body 1 mm in the tail 0.65

## 2023-08-22 NOTE — DISCHARGE INSTRUCTIONS
[facility]    POST-ENDOSCOPY INSTRUCTIONS    1. ACTIVITY   No driving, operating machinery, or making important decisions for 24 hours. Resume normal activity after 24 hours. You may return to work after 24 hours. 2. DIET        Resume your usual diet unless specified below. ***    3. MEDICATIONS    Resume your usual medications. Do not consume alcohol, tranquilizers, or sleeping medications for 24 hour unless advised by your physician. 4. PHYSICIAN FOLLOW-UP / INSTRUCTIONS    Please call the office/clinic in 10 days for biopsy results:      [  ] GI office:  [phone]          Follow up with your family physician as planned. 6. NORMAL CHANGES YOU MAY EXPERIENCE AFTER ENDOSCOPY:         EGD/EUS         Sore throat after EGD/EUS       A bloated feeling and belching from       air in stomach               You may feel fatigued for the next 24-48    hours due to the prep and sedation    7. CALL YOUR PHYSICIAN IF YOU EXPERIENCE ANY OF THE FOLLOWING      A. Passing blood rectally or vomiting blood (color may be red or black)      B. Severe abdominal pain or tenderness (that is not relieved by passing air)      C.   Fever, chills, or excessive sweating      D.  Persistent nausea or vomiting      E.  Redness or swelling at the IV site    If you have additional questions, PLEASE call your doctor or the [address] [facility] ([phone])

## 2023-08-22 NOTE — H&P
History and Physical    Pt Name: Romeo Martin  MRN: [id number]  YOB: 1995  Date of evaluation: 8/22/2023  Primary Care Physician: ADRIANA Hughes CNP    SUBJECTIVE:   History of Chief Complaint:    Romeo Martin is a 25 y.o. female who is scheduled today for ENDOSCOPIC ULTRASOUND WITH PATHOLOGY. She reports complaint of 6 week history of intermittent epigastric, left upper quadrant abdominal pain. She states some episodes worse than others. She reports she has followed with GI and had EGD recently that was negative (per the patient) and that she has a colonoscopy scheduled in near future. She reports mild LUQ pain today. She is taking daily Pantoprazole. Allergies  is allergic to other and seasonal.  Medications  Prior to Admission medications    Medication Sig Start Date End Date Taking? Authorizing Provider   lisdexamfetamine (VYVANSE) 20 MG CAPS Take 1 capsule by mouth daily for 30 days.  Max Daily Amount: 20 mg 9/11/23 10/11/23  ADRIANA Hughes CNP   Probiotic Product ([place] PROBIOTIC PO) Take 1 tablet by mouth daily    Historical Provider, MD   Multiple Vitamins-Minerals (MULTIVITAMIN ADULT EXTRA C PO) Take 2 tablets by mouth daily    Historical Provider, MD   pantoprazole (PROTONIX) 40 MG tablet Take 1 tablet by mouth every morning 7/16/23   Historical Provider, MD   SUMAtriptan (IMITREX) 25 MG tablet Take 1 tablet by mouth once as needed for Migraine 7/11/23 8/17/23  ADRIANA Hughes CNP   ondansetron (ZOFRAN-ODT) 4 MG disintegrating tablet Take 1 tablet by mouth 3 times daily as needed for Nausea or Vomiting 7/11/23   ADRIANA Hughes CNP   Norgestim-Eth Estrad Triphasic 0.18/0.215/0.25 MG-35 MCG TABS take 1 tablet by mouth once daily  Patient taking differently: Take 1 tablet by mouth at bedtime 3/20/23   Neda Dorman DO   albuterol sulfate HFA (PROVENTIL;VENTOLIN;PROAIR) 108 (90 Base) MCG/ACT inhaler Inhale 2 puffs into the lungs as needed for

## 2023-08-22 NOTE — ANESTHESIA PRE PROCEDURE
Department of Anesthesiology  Preprocedure Note       Name:  Keo Diehl   Age:  25 y.o.  :  1995                                          MRN:  [id number]         Date:  2023      Surgeon: David Padilla):  Josiah Escalante MD    Procedure: Procedure(s):  ENDOSCOPIC ULTRASOUND WITH PATHOLOGY    Medications prior to admission:   Prior to Admission medications    Medication Sig Start Date End Date Taking? Authorizing Provider   lisdexamfetamine (VYVANSE) 20 MG CAPS Take 1 capsule by mouth daily for 30 days. Max Daily Amount: 20 mg 9/11/23 10/11/23  ADRIANA Anton CNP   Probiotic Product ([place] PROBIOTIC PO) Take 1 tablet by mouth daily    Historical Provider, MD   Multiple Vitamins-Minerals (MULTIVITAMIN ADULT EXTRA C PO) Take 2 tablets by mouth daily    Historical Provider, MD   pantoprazole (PROTONIX) 40 MG tablet Take 1 tablet by mouth every morning 23   Historical Provider, MD   SUMAtriptan (IMITREX) 25 MG tablet Take 1 tablet by mouth once as needed for Migraine 23  ADRIANA Anton CNP   ondansetron (ZOFRAN-ODT) 4 MG disintegrating tablet Take 1 tablet by mouth 3 times daily as needed for Nausea or Vomiting 23   ADRIANA Anton CNP   Norgestim-Eth Estrad Triphasic 0.18/0.215/0.25 MG-35 MCG TABS take 1 tablet by mouth once daily  Patient taking differently: Take 1 tablet by mouth at bedtime 3/20/23   Kings Brewster DO   albuterol sulfate HFA (PROVENTIL;VENTOLIN;PROAIR) 108 (90 Base) MCG/ACT inhaler Inhale 2 puffs into the lungs as needed for Wheezing or Shortness of Breath  Patient taking differently: Inhale 2 puffs into the lungs every 6 hours as needed for Wheezing or Shortness of Breath 22   ADRIANA Anton CNP   Fluticasone Propionate (FLONASE NA) 1 spray by Nasal route daily as needed    Historical Provider, MD       Current medications:    No current facility-administered medications for this encounter.

## 2023-08-25 ENCOUNTER — TELEPHONE (OUTPATIENT)
Dept: GASTROENTEROLOGY | Age: 28
End: 2023-08-25

## 2023-08-25 LAB
MISCELLANEOUS LAB TEST RESULT: NORMAL
SURGICAL PATHOLOGY REPORT: NORMAL
TEST NAME: NORMAL

## 2023-08-25 NOTE — TELEPHONE ENCOUNTER
Per Dr. Yi Rodrigez - -- Diagnosis --   DUODENUM, BIOPSY:   - DUODENAL MUCOSA WITH NO SIGNIFICANT PATHOLOGIC ABNORMALITY. - NO MORPHOLOGIC FEATURES OF CELIAC DISEASE. Patient notified of results. Had no further questions.

## 2023-08-28 ENCOUNTER — HOSPITAL ENCOUNTER (OUTPATIENT)
Age: 28
Setting detail: SPECIMEN
Discharge: HOME | End: 2023-08-28

## 2023-08-29 LAB
C DIFF GDH + TOXINS A+B STL QL IA.RAPID: NEGATIVE
SPECIMEN DESCRIPTION: NORMAL

## 2023-08-31 LAB
CALPROTECTIN, FECAL: 634 UG/G
MISCELLANEOUS LAB TEST RESULT: NORMAL
TEST NAME: NORMAL

## 2023-09-05 ENCOUNTER — OFFICE VISIT (OUTPATIENT)
Dept: FAMILY MEDICINE CLINIC | Age: 28
End: 2023-09-05
Payer: COMMERCIAL

## 2023-09-05 ENCOUNTER — HOSPITAL ENCOUNTER (OUTPATIENT)
Age: 28
Setting detail: SPECIMEN
Discharge: HOME | End: 2023-09-05

## 2023-09-05 VITALS
WEIGHT: 136 LBS | BODY MASS INDEX: 22.63 KG/M2 | HEART RATE: 101 BPM | OXYGEN SATURATION: 99 % | SYSTOLIC BLOOD PRESSURE: 124 MMHG | DIASTOLIC BLOOD PRESSURE: 80 MMHG

## 2023-09-05 DIAGNOSIS — D72.10 EOSINOPHILIA, UNSPECIFIED TYPE: ICD-10-CM

## 2023-09-05 DIAGNOSIS — F41.9 ANXIETY: ICD-10-CM

## 2023-09-05 DIAGNOSIS — D72.10 EOSINOPHILIA, UNSPECIFIED TYPE: Primary | ICD-10-CM

## 2023-09-05 DIAGNOSIS — Z13.29 SCREENING FOR THYROID DISORDER: ICD-10-CM

## 2023-09-05 LAB
ERYTHROCYTE [DISTWIDTH] IN BLOOD BY AUTOMATED COUNT: 12.2 % (ref 11.8–14.4)
HCT VFR BLD AUTO: 41.3 % (ref 36.3–47.1)
HGB BLD-MCNC: 13.5 G/DL (ref 11.9–15.1)
MCH RBC QN AUTO: 29.2 PG (ref 25.2–33.5)
MCHC RBC AUTO-ENTMCNC: 32.7 G/DL (ref 28.4–34.8)
MCV RBC AUTO: 89.4 FL (ref 82.6–102.9)
NRBC BLD-RTO: 0 PER 100 WBC
PLATELET # BLD AUTO: 277 K/UL (ref 138–453)
PMV BLD AUTO: 10.4 FL (ref 8.1–13.5)
RBC # BLD AUTO: 4.62 M/UL (ref 3.95–5.11)
WBC OTHER # BLD: 7.6 K/UL (ref 3.5–11.3)

## 2023-09-05 PROCEDURE — 99213 OFFICE O/P EST LOW 20 MIN: CPT | Performed by: NURSE PRACTITIONER

## 2023-09-05 RX ORDER — SERTRALINE HYDROCHLORIDE 25 MG/1
25 TABLET, FILM COATED ORAL DAILY
Qty: 30 TABLET | Refills: 3 | Status: SHIPPED | OUTPATIENT
Start: 2023-09-05

## 2023-09-05 ASSESSMENT — ENCOUNTER SYMPTOMS
ABDOMINAL PAIN: 1
COUGH: 0
SHORTNESS OF BREATH: 0

## 2023-09-05 NOTE — PROGRESS NOTES
Patient is present complaining of anxiety   Patient states she has been seen for abd issues and thinks it could be anxiety related since they cannot find anything wrong  States she does see a therapist now   States she used to see psych and was on medications in the past   States she was on zoloft on the past

## 2023-09-05 NOTE — PROGRESS NOTES
Ashlee Richmond (:  1995) is a 25 y.o. female,Established patient, here for evaluation of the following chief complaint(s): Anxiety         ASSESSMENT/PLAN:  1. Eosinophilia, unspecified type  -     CBC; Future  2. Screening for thyroid disorder  -     TSH; Future  3. Anxiety  -     sertraline (ZOLOFT) 25 MG tablet; Take 1 tablet by mouth daily, Disp-30 tablet, R-3Normal    Rto in 1 month   No follow-ups on file. Subjective   SUBJECTIVE/OBJECTIVE:  Anxiety  Patient reports no chest pain, palpitations or shortness of breath. Pt has been c/o stomach issues and has had extensive studies. States everything has turned out wnl. [facility] She is hoping she doesn't have to go. She is having considerable anxiety. She is getting focused on ideas and has a hard time relaxing. She has been on zoloft in the past and would like to try again. Review of Systems   Constitutional:  Negative for chills and fever. Respiratory:  Negative for cough and shortness of breath. Cardiovascular:  Negative for chest pain, palpitations and leg swelling. Gastrointestinal:  Positive for abdominal pain. Objective   Vitals:    23 1534   BP: 124/80   Pulse: (!) 101   SpO2: 99%     Physical Exam  Constitutional:       Appearance: She is well-developed. HENT:      Right Ear: External ear normal.      Left Ear: External ear normal.      Nose: Nose normal.   Cardiovascular:      Rate and Rhythm: Normal rate and regular rhythm. Heart sounds: Normal heart sounds, S1 normal and S2 normal.   Pulmonary:      Effort: Pulmonary effort is normal. No respiratory distress. Breath sounds: Normal breath sounds. Musculoskeletal:         General: No deformity. Normal range of motion. Cervical back: Full passive range of motion without pain and normal range of motion. Skin:     General: Skin is warm and dry.    Neurological:      Mental Status: She is alert and oriented to

## 2023-09-06 LAB — TSH SERPL DL<=0.05 MIU/L-ACNC: 1.5 UIU/ML (ref 0.3–5)

## 2023-09-10 LAB — IBD PANEL: NORMAL

## 2023-09-11 ENCOUNTER — HOSPITAL ENCOUNTER (OUTPATIENT)
Age: 28
Setting detail: SPECIMEN
Discharge: HOME | End: 2023-09-11

## 2023-09-14 LAB — SURGICAL PATHOLOGY REPORT: NORMAL

## 2023-10-13 ENCOUNTER — OFFICE VISIT (OUTPATIENT)
Dept: FAMILY MEDICINE CLINIC | Age: 28
End: 2023-10-13
Payer: COMMERCIAL

## 2023-10-13 VITALS
BODY MASS INDEX: 22.13 KG/M2 | HEART RATE: 72 BPM | SYSTOLIC BLOOD PRESSURE: 100 MMHG | OXYGEN SATURATION: 98 % | WEIGHT: 133 LBS | DIASTOLIC BLOOD PRESSURE: 62 MMHG

## 2023-10-13 DIAGNOSIS — K63.89 SMALL INTESTINAL BACTERIAL OVERGROWTH (SIBO): Primary | ICD-10-CM

## 2023-10-13 DIAGNOSIS — F90.9 ATTENTION DEFICIT HYPERACTIVITY DISORDER (ADHD), UNSPECIFIED ADHD TYPE: ICD-10-CM

## 2023-10-13 PROCEDURE — 99214 OFFICE O/P EST MOD 30 MIN: CPT | Performed by: NURSE PRACTITIONER

## 2023-10-13 RX ORDER — LISDEXAMFETAMINE DIMESYLATE CAPSULES 10 MG/1
10 CAPSULE ORAL EVERY MORNING
Qty: 30 CAPSULE | Refills: 0 | Status: SHIPPED | OUTPATIENT
Start: 2023-10-13 | End: 2023-11-12

## 2023-10-13 RX ORDER — LISDEXAMFETAMINE DIMESYLATE CAPSULES 20 MG/1
20 CAPSULE ORAL DAILY
Qty: 30 CAPSULE | Refills: 0 | Status: CANCELLED | OUTPATIENT
Start: 2023-10-13 | End: 2023-11-12

## 2023-10-13 ASSESSMENT — ENCOUNTER SYMPTOMS
COUGH: 0
SHORTNESS OF BREATH: 0

## 2023-10-13 NOTE — PROGRESS NOTES
Patient is present for f/u for ADHD and Anxiety    Patient states the zoloft has been helping  States she has seen psych since her last visit and they are going to take over prescribing zoloft
Mental Status: She is alert and oriented to person, place, and time. An electronic signature was used to authenticate this note.     --Sarita Hurtado, ADRIANA - CNP

## 2023-11-29 ENCOUNTER — HOSPITAL ENCOUNTER (OUTPATIENT)
Age: 28
Setting detail: SPECIMEN
Discharge: HOME | End: 2023-11-29

## 2023-11-29 DIAGNOSIS — K63.89 SMALL INTESTINAL BACTERIAL OVERGROWTH (SIBO): ICD-10-CM

## 2023-11-29 LAB
BASOPHILS # BLD: 0.06 K/UL (ref 0–0.2)
BASOPHILS NFR BLD: 1 % (ref 0–2)
EOSINOPHIL # BLD: 0.77 K/UL (ref 0–0.44)
EOSINOPHILS RELATIVE PERCENT: 13 % (ref 1–4)
ERYTHROCYTE [DISTWIDTH] IN BLOOD BY AUTOMATED COUNT: 12.3 % (ref 11.8–14.4)
HCT VFR BLD AUTO: 40.6 % (ref 36.3–47.1)
HGB BLD-MCNC: 13 G/DL (ref 11.9–15.1)
IMM GRANULOCYTES # BLD AUTO: <0.03 K/UL (ref 0–0.3)
IMM GRANULOCYTES NFR BLD: 0 %
LYMPHOCYTES NFR BLD: 1.95 K/UL (ref 1.1–3.7)
LYMPHOCYTES RELATIVE PERCENT: 32 % (ref 24–43)
MCH RBC QN AUTO: 29.6 PG (ref 25.2–33.5)
MCHC RBC AUTO-ENTMCNC: 32 G/DL (ref 28.4–34.8)
MCV RBC AUTO: 92.5 FL (ref 82.6–102.9)
MONOCYTES NFR BLD: 0.44 K/UL (ref 0.1–1.2)
MONOCYTES NFR BLD: 7 % (ref 3–12)
NEUTROPHILS NFR BLD: 47 % (ref 36–65)
NEUTS SEG NFR BLD: 2.9 K/UL (ref 1.5–8.1)
NRBC BLD-RTO: 0 PER 100 WBC
PLATELET # BLD AUTO: 220 K/UL (ref 138–453)
PMV BLD AUTO: 11.7 FL (ref 8.1–13.5)
RBC # BLD AUTO: 4.39 M/UL (ref 3.95–5.11)
VIT B12 SERPL-MCNC: 744 PG/ML (ref 232–1245)
WBC OTHER # BLD: 6.1 K/UL (ref 3.5–11.3)

## 2023-12-01 ENCOUNTER — HOSPITAL ENCOUNTER (OUTPATIENT)
Age: 28
Setting detail: SPECIMEN
Discharge: HOME | End: 2023-12-01

## 2023-12-01 LAB — ZINC SERPL-MCNC: 87.2 UG/DL (ref 60–120)

## 2023-12-02 LAB
C DIFFICILE TOXINS, PCR: NORMAL
MISCELLANEOUS LAB TEST RESULT: NORMAL
SPECIMEN DESCRIPTION: NORMAL
TEST NAME: NORMAL

## 2023-12-05 LAB
CALPROTECTIN, FECAL: 6 UG/G
FECAL PANCREATIC ELASTASE-1: 628 UG/G
MISCELLANEOUS LAB TEST RESULT: NORMAL
TEST NAME: NORMAL

## 2023-12-07 ENCOUNTER — TELEMEDICINE (OUTPATIENT)
Dept: FAMILY MEDICINE CLINIC | Age: 28
End: 2023-12-07
Payer: COMMERCIAL

## 2023-12-07 DIAGNOSIS — K63.89 SMALL INTESTINAL BACTERIAL OVERGROWTH (SIBO): ICD-10-CM

## 2023-12-07 DIAGNOSIS — Z79.899 MEDICATION MANAGEMENT: ICD-10-CM

## 2023-12-07 DIAGNOSIS — D72.10 EOSINOPHILIA, UNSPECIFIED TYPE: Primary | ICD-10-CM

## 2023-12-07 DIAGNOSIS — F41.9 ANXIETY: ICD-10-CM

## 2023-12-07 DIAGNOSIS — Z13.29 SCREENING FOR THYROID DISORDER: ICD-10-CM

## 2023-12-07 DIAGNOSIS — Z13.220 SCREENING, LIPID: ICD-10-CM

## 2023-12-07 LAB
FAT QUALITATIVE SPLIT STOOL: NORMAL
FECAL NEUTRAL FAT: NORMAL

## 2023-12-07 PROCEDURE — 99214 OFFICE O/P EST MOD 30 MIN: CPT | Performed by: NURSE PRACTITIONER

## 2023-12-07 ASSESSMENT — ENCOUNTER SYMPTOMS
SHORTNESS OF BREATH: 0
COUGH: 0

## 2023-12-07 NOTE — PROGRESS NOTES
Juany Benavides, was evaluated through a synchronous (real-time) audio-video encounter. The patient (or guardian if applicable) is aware that this is a billable service, which includes applicable co-pays. This Virtual Visit was conducted with patient's (and/or legal guardian's) consent. Patient identification was verified, and a caregiver was present when appropriate. The patient was located at Home: [address] [facility] [place]  Provider was located at Home ([address] [facility]): [address] [place] [address] (:  1995) is a Established patient, presenting virtually for evaluation of the following:    Assessment & Plan   Below is the assessment and plan developed based on review of pertinent history, physical exam, labs, studies, and medications. 1. Eosinophilia, unspecified type  -     AFL - Christen Otoole MD, Allergy & Immunology, [place]  2. Small intestinal bacterial overgrowth (SIBO)  -     Histamine; Future  3. Screening, lipid  -     Lipid Panel; Future  4. Screening for thyroid disorder  -     Thyroid Peroxidase Antibody; Future  5. Anxiety  -     DHEA-Sulfate; Future  -     Salivary Cortisol; Future  -     Estradiol; Future  6. Medication management  -     Iron and TIBC; Future  -     Ferritin; Future  -     Homocysteine; Future  -     Magnesium; Future    No follow-ups on file. Subjective   HPI  Wants to see an allergist. Persistent eosinophilia   Would like some additional lab testing done. She is working with an nutritionist. Did discuss in previous message that insurance may not cover. Review of Systems   Constitutional:  Negative for chills and fever. Respiratory:  Negative for cough and shortness of breath. Cardiovascular:  Negative for chest pain, palpitations and leg swelling.           Objective   Patient-Reported Vitals  No data recorded     Physical Exam  [INSTRUCTIONS:  \"[x]\" Indicates a positive item  \"[]\" Indicates a negative item  -- DELETE ALL ITEMS NOT

## 2024-03-04 DIAGNOSIS — Z76.0 MEDICATION REFILL: ICD-10-CM

## 2024-03-04 DIAGNOSIS — N94.6 DYSMENORRHEA: Primary | ICD-10-CM

## 2024-03-04 RX ORDER — NORGESTIMATE AND ETHINYL ESTRADIOL 7DAYSX3 28
1 KIT ORAL DAILY
Qty: 28 TABLET | Refills: 12 | Status: SHIPPED | OUTPATIENT
Start: 2024-03-04

## 2024-06-02 NOTE — PROGRESS NOTES
[facility]  [address]  [address]  [place] [address]  Dept: [phone]    Chief complaint:   Chief Complaint   Patient presents with    Annual Exam     Pap 3/24/22 wnl/hpv neg       History Present Illness: Emilee is a 27 yo female who presents for her annual exam.  She denies any GYN complaints.  She did get breast reduction surgery and lift about 9 months ago.  She says her neck pain and shoulder pain is much better.  She says she wishes they were slightly smaller, but she is happy with the outcome.  She had a UTI recently and was treated.  She is sexually active with her  and denies any dyspareunia.  She denies any vaginal discharge or irritation.  She denies any pelvic pain.  She denies any bowel or bladder issues.       Current Medications (OTC/Herbal):   Current Outpatient Medications   Medication Sig Dispense Refill    Norgestim-Eth Estrad Triphasic 0.18/0.215/0.25 MG-35 MCG TABS Take 1 tablet by mouth daily 28 tablet 12    lisdexamfetamine (VYVANSE) 10 MG capsule Take 1 capsule by mouth every morning for 30 days. Max Daily Amount: 10 mg (Patient not taking: Reported on 12/7/2023) 30 capsule 0    sertraline (ZOLOFT) 25 MG tablet Take 1 tablet by mouth daily 30 tablet 3    Probiotic Product ([place] PROBIOTIC PO) Take 1 tablet by mouth daily      Multiple Vitamins-Minerals (MULTIVITAMIN ADULT EXTRA C PO) Take 2 tablets by mouth daily      SUMAtriptan (IMITREX) 25 MG tablet Take 1 tablet by mouth once as needed for Migraine 9 tablet 2    ondansetron (ZOFRAN-ODT) 4 MG disintegrating tablet Take 1 tablet by mouth 3 times daily as needed for Nausea or Vomiting 21 tablet 0    albuterol sulfate HFA (PROVENTIL;VENTOLIN;PROAIR) 108 (90 Base) MCG/ACT inhaler Inhale 2 puffs into the lungs as needed for Wheezing or Shortness of Breath (Patient taking differently: Inhale 2 puffs into the lungs every 6 hours as needed for Wheezing

## 2024-06-03 ASSESSMENT — PATIENT HEALTH QUESTIONNAIRE - PHQ9
1. LITTLE INTEREST OR PLEASURE IN DOING THINGS: NOT AT ALL
2. FEELING DOWN, DEPRESSED OR HOPELESS: NOT AT ALL
SUM OF ALL RESPONSES TO PHQ QUESTIONS 1-9: 0
SUM OF ALL RESPONSES TO PHQ9 QUESTIONS 1 & 2: 0
1. LITTLE INTEREST OR PLEASURE IN DOING THINGS: NOT AT ALL
2. FEELING DOWN, DEPRESSED OR HOPELESS: NOT AT ALL
SUM OF ALL RESPONSES TO PHQ QUESTIONS 1-9: 0
SUM OF ALL RESPONSES TO PHQ9 QUESTIONS 1 & 2: 0

## 2024-06-04 ENCOUNTER — OFFICE VISIT (OUTPATIENT)
Dept: OBGYN CLINIC | Age: 29
End: 2024-06-04
Payer: COMMERCIAL

## 2024-06-04 VITALS
BODY MASS INDEX: 23.82 KG/M2 | DIASTOLIC BLOOD PRESSURE: 79 MMHG | SYSTOLIC BLOOD PRESSURE: 123 MMHG | WEIGHT: 143 LBS | HEART RATE: 97 BPM | HEIGHT: 65 IN

## 2024-06-04 DIAGNOSIS — Z01.419 ENCOUNTER FOR GYNECOLOGICAL EXAMINATION: Primary | ICD-10-CM

## 2024-06-04 PROCEDURE — 99395 PREV VISIT EST AGE 18-39: CPT | Performed by: OBSTETRICS & GYNECOLOGY

## 2024-06-04 ASSESSMENT — ENCOUNTER SYMPTOMS
ABDOMINAL PAIN: 0
SHORTNESS OF BREATH: 0
COUGH: 0
BACK PAIN: 0

## 2024-07-16 NOTE — TELEPHONE ENCOUNTER
Last visit: 12/07/23  Last Med refill:  PENDED FOR 1 INHALER, DUE FOR APPT  Does patient have enough medication for 72 hours: UNKNOWN    Next Visit Date:  MY CHART SCHEDULING LINK SENT   No future appointments.    Health Maintenance   Topic Date Due    Varicella vaccine (1 of 2 - 2-dose childhood series) Never done    COVID-19 Vaccine (5 - 2023-24 season) 09/01/2023    Flu vaccine (1) 08/01/2024    Cervical cancer screen  03/24/2025    Depression Screen  06/03/2025    DTaP/Tdap/Td vaccine (7 - Td or Tdap) 05/19/2026    Hepatitis B vaccine  Completed    Hib vaccine  Completed    HPV vaccine  Completed    Polio vaccine  Completed    Hepatitis C screen  Completed    HIV screen  Completed    Hepatitis A vaccine  Aged Out    Meningococcal (ACWY) vaccine  Aged Out    Pneumococcal 0-64 years Vaccine  Aged Out       No results found for: \"LABA1C\"          ( goal A1C is < 7)   No components found for: \"LABMICR\"  No components found for: \"LDLCHOLESTEROL\", \"LDLCALC\"    (goal LDL is <100)   AST (U/L)   Date Value   07/08/2022 17     ALT (U/L)   Date Value   07/08/2022 14     BUN (mg/dL)   Date Value   03/29/2022 18     BP Readings from Last 3 Encounters:   06/04/24 123/79   05/06/24 132/80   10/13/23 100/62          (goal 120/80)    All Future Testing planned in [facility]  Lab Frequency Next Occurrence   Iron and TIBC Once 12/07/2023   Ferritin Once 12/07/2023   Homocysteine Once 12/07/2023   Magnesium Once 12/07/2023   Lipid Panel Once 12/07/2023   Thyroid Peroxidase Antibody Once 12/07/2023   DHEA-Sulfate Once 12/07/2023   Salivary Cortisol Once 12/07/2023   Estradiol Once 12/07/2023   Histamine Once 12/07/2023               Patient Active Problem List:     Attention deficit hyperactivity disorder (ADHD)

## 2024-07-17 RX ORDER — ALBUTEROL SULFATE 90 UG/1
2 AEROSOL, METERED RESPIRATORY (INHALATION) EVERY 6 HOURS PRN
Qty: 1 EACH | Refills: 0 | Status: SHIPPED | OUTPATIENT
Start: 2024-07-17

## 2024-08-20 DIAGNOSIS — Z76.0 MEDICATION REFILL: ICD-10-CM

## 2024-08-20 DIAGNOSIS — N94.6 DYSMENORRHEA: ICD-10-CM

## 2024-08-20 RX ORDER — NORGESTIMATE AND ETHINYL ESTRADIOL 7DAYSX3 28
1 KIT ORAL DAILY
Qty: 28 TABLET | Refills: 11 | Status: SHIPPED | OUTPATIENT
Start: 2024-08-20

## 2024-08-20 NOTE — TELEPHONE ENCOUNTER
Gyn (Dr. Esteves pt)    Last seen 6.4.24 annual  No future appts    Med refill request   Last refill 3.4.24 (pt had meds originally through [facility] so new script sent pls)

## 2024-12-23 ENCOUNTER — HOSPITAL ENCOUNTER (OUTPATIENT)
Age: 29
Setting detail: SPECIMEN
Discharge: HOME | End: 2024-12-23

## 2024-12-23 ENCOUNTER — OFFICE VISIT (OUTPATIENT)
Dept: FAMILY MEDICINE CLINIC | Age: 29
End: 2024-12-23

## 2024-12-23 VITALS
HEART RATE: 93 BPM | SYSTOLIC BLOOD PRESSURE: 110 MMHG | DIASTOLIC BLOOD PRESSURE: 60 MMHG | WEIGHT: 151 LBS | OXYGEN SATURATION: 98 % | BODY MASS INDEX: 25.13 KG/M2

## 2024-12-23 DIAGNOSIS — Z79.899 MEDICATION MANAGEMENT: ICD-10-CM

## 2024-12-23 DIAGNOSIS — Z13.220 SCREENING, LIPID: ICD-10-CM

## 2024-12-23 DIAGNOSIS — Z00.00 ENCOUNTER FOR WELL ADULT EXAM WITHOUT ABNORMAL FINDINGS: ICD-10-CM

## 2024-12-23 DIAGNOSIS — Z13.29 SCREENING FOR THYROID DISORDER: Primary | ICD-10-CM

## 2024-12-23 DIAGNOSIS — Z76.0 MEDICATION REFILL: ICD-10-CM

## 2024-12-23 DIAGNOSIS — Z13.29 SCREENING FOR THYROID DISORDER: ICD-10-CM

## 2024-12-23 LAB
ALBUMIN SERPL-MCNC: 4.1 G/DL (ref 3.5–5.2)
ALBUMIN/GLOB SERPL: 1.6 {RATIO} (ref 1–2.5)
ALP SERPL-CCNC: 48 U/L (ref 35–104)
ALT SERPL-CCNC: 21 U/L (ref 10–35)
ANION GAP SERPL CALCULATED.3IONS-SCNC: 11 MMOL/L (ref 9–16)
AST SERPL-CCNC: 21 U/L (ref 10–35)
BASOPHILS # BLD: 0.05 K/UL (ref 0–0.2)
BASOPHILS NFR BLD: 1 % (ref 0–2)
BILIRUB SERPL-MCNC: 0.3 MG/DL (ref 0–1.2)
BUN SERPL-MCNC: 13 MG/DL (ref 6–20)
CALCIUM SERPL-MCNC: 9.4 MG/DL (ref 8.6–10.4)
CHLORIDE SERPL-SCNC: 102 MMOL/L (ref 98–107)
CHOLEST SERPL-MCNC: 180 MG/DL (ref 0–199)
CHOLESTEROL/HDL RATIO: 2
CO2 SERPL-SCNC: 27 MMOL/L (ref 20–31)
CREAT SERPL-MCNC: 0.9 MG/DL (ref 0.6–0.9)
EOSINOPHIL # BLD: 0.28 K/UL (ref 0–0.44)
EOSINOPHILS RELATIVE PERCENT: 4 % (ref 1–4)
ERYTHROCYTE [DISTWIDTH] IN BLOOD BY AUTOMATED COUNT: 12.3 % (ref 11.8–14.4)
GFR, ESTIMATED: 88 ML/MIN/1.73M2
GLUCOSE SERPL-MCNC: 75 MG/DL (ref 74–99)
HCT VFR BLD AUTO: 40.1 % (ref 36.3–47.1)
HDLC SERPL-MCNC: 91 MG/DL
HGB BLD-MCNC: 12.6 G/DL (ref 11.9–15.1)
IMM GRANULOCYTES # BLD AUTO: 0.03 K/UL (ref 0–0.3)
IMM GRANULOCYTES NFR BLD: 0 %
LDLC SERPL CALC-MCNC: 67 MG/DL (ref 0–100)
LYMPHOCYTES NFR BLD: 2.03 K/UL (ref 1.1–3.7)
LYMPHOCYTES RELATIVE PERCENT: 25 % (ref 24–43)
MCH RBC QN AUTO: 29.1 PG (ref 25.2–33.5)
MCHC RBC AUTO-ENTMCNC: 31.4 G/DL (ref 28.4–34.8)
MCV RBC AUTO: 92.6 FL (ref 82.6–102.9)
MONOCYTES NFR BLD: 0.61 K/UL (ref 0.1–1.2)
MONOCYTES NFR BLD: 8 % (ref 3–12)
NEUTROPHILS NFR BLD: 62 % (ref 36–65)
NEUTS SEG NFR BLD: 5.02 K/UL (ref 1.5–8.1)
NRBC BLD-RTO: 0 PER 100 WBC
PLATELET # BLD AUTO: 227 K/UL (ref 138–453)
PMV BLD AUTO: 11.1 FL (ref 8.1–13.5)
POTASSIUM SERPL-SCNC: 4 MMOL/L (ref 3.7–5.3)
PROT SERPL-MCNC: 6.7 G/DL (ref 6.6–8.7)
RBC # BLD AUTO: 4.33 M/UL (ref 3.95–5.11)
SODIUM SERPL-SCNC: 140 MMOL/L (ref 136–145)
TRIGL SERPL-MCNC: 112 MG/DL
TSH SERPL DL<=0.05 MIU/L-ACNC: 1.75 UIU/ML (ref 0.27–4.2)
VLDLC SERPL CALC-MCNC: 22 MG/DL (ref 1–30)
WBC OTHER # BLD: 8 K/UL (ref 3.5–11.3)

## 2024-12-23 RX ORDER — ONDANSETRON 4 MG/1
4 TABLET, ORALLY DISINTEGRATING ORAL 3 TIMES DAILY PRN
Qty: 21 TABLET | Refills: 0 | Status: SHIPPED | OUTPATIENT
Start: 2024-12-23

## 2024-12-23 RX ORDER — SUMATRIPTAN SUCCINATE 25 MG/1
25 TABLET ORAL
Qty: 9 TABLET | Refills: 2 | Status: SHIPPED | OUTPATIENT
Start: 2024-12-23 | End: 2024-12-23

## 2024-12-23 SDOH — ECONOMIC STABILITY: FOOD INSECURITY: WITHIN THE PAST 12 MONTHS, THE FOOD YOU BOUGHT JUST DIDN'T LAST AND YOU DIDN'T HAVE MONEY TO GET MORE.: NEVER TRUE

## 2024-12-23 SDOH — ECONOMIC STABILITY: FOOD INSECURITY: WITHIN THE PAST 12 MONTHS, YOU WORRIED THAT YOUR FOOD WOULD RUN OUT BEFORE YOU GOT MONEY TO BUY MORE.: NEVER TRUE

## 2024-12-23 SDOH — ECONOMIC STABILITY: INCOME INSECURITY: HOW HARD IS IT FOR YOU TO PAY FOR THE VERY BASICS LIKE FOOD, HOUSING, MEDICAL CARE, AND HEATING?: NOT HARD AT ALL

## 2024-12-23 ASSESSMENT — ENCOUNTER SYMPTOMS
CHEST TIGHTNESS: 0
COUGH: 0
EYE DISCHARGE: 0
DIARRHEA: 0
CONSTIPATION: 0
SHORTNESS OF BREATH: 0
RHINORRHEA: 0
SINUS PRESSURE: 0
BLOOD IN STOOL: 0
ABDOMINAL PAIN: 0

## 2024-12-23 NOTE — PROGRESS NOTES
Well Adult Note  Name: Emilee Wayne Today’s Date: 2024   MRN: [id number] Sex: Female   Age: 29 y.o. Ethnicity:  /    : 1995 Race: Other      Emilee Wayne is here for a well adult exam.       Assessment & Plan   Screening for thyroid disorder  -     TSH; Future  Medication refill  -     SUMAtriptan (IMITREX) 25 MG tablet; Take 1 tablet by mouth once as needed for Migraine, Disp-9 tablet, R-2Normal  -     ondansetron (ZOFRAN-ODT) 4 MG disintegrating tablet; Take 1 tablet by mouth 3 times daily as needed for Nausea or Vomiting, Disp-21 tablet, R-0Normal  Screening, lipid  -     Lipid Panel; Future  Medication management  -     Comprehensive Metabolic Panel; Future  -     CBC with Auto Differential; Future        No follow-ups on file.       Subjective       Review of Systems   Constitutional:  Negative for activity change, appetite change, chills, fatigue and fever.   HENT:  Negative for congestion, ear pain, rhinorrhea and sinus pressure.    Eyes:  Negative for discharge and visual disturbance.   Respiratory:  Negative for cough, chest tightness and shortness of breath.    Cardiovascular:  Negative for chest pain, palpitations and leg swelling.   Gastrointestinal:  Negative for abdominal pain, blood in stool, constipation and diarrhea.   Endocrine: Negative for cold intolerance and heat intolerance.   Genitourinary:  Negative for difficulty urinating and hematuria.   Musculoskeletal:  Negative for arthralgias and myalgias.   Skin:  Negative for rash.   Neurological:  Negative for dizziness, light-headedness and headaches.   Psychiatric/Behavioral:  Negative for dysphoric mood and self-injury.        Allergies   Allergen Reactions    Other     Seasonal      Prior to Visit Medications    Medication Sig Taking? Authorizing Provider   SUMAtriptan (IMITREX) 25 MG tablet Take 1 tablet by mouth once as needed for Migraine Yes Micaela Freedman, APRN - CNP   ondansetron (ZOFRAN-ODT) 4 MG

## 2025-03-10 DIAGNOSIS — Z76.0 MEDICATION REFILL: ICD-10-CM

## 2025-03-10 DIAGNOSIS — N94.6 DYSMENORRHEA: ICD-10-CM

## 2025-03-11 RX ORDER — NORGESTIMATE AND ETHINYL ESTRADIOL 7DAYSX3 28
1 KIT ORAL DAILY
Qty: 28 TABLET | Refills: 11 | Status: SHIPPED | OUTPATIENT
Start: 2025-03-11 | End: 2025-03-12 | Stop reason: SDUPTHER

## 2025-03-12 DIAGNOSIS — Z76.0 MEDICATION REFILL: ICD-10-CM

## 2025-03-12 DIAGNOSIS — N94.6 DYSMENORRHEA: ICD-10-CM

## 2025-03-12 RX ORDER — NORGESTIMATE AND ETHINYL ESTRADIOL 7DAYSX3 28
1 KIT ORAL DAILY
Qty: 28 TABLET | Refills: 11 | Status: SHIPPED | OUTPATIENT
Start: 2025-03-12

## (undated) DEVICE — FORCEPS BX L240CM WRK CHN 2.8MM STD CAP W/ NDL MIC MESH